# Patient Record
Sex: FEMALE | URBAN - METROPOLITAN AREA
[De-identification: names, ages, dates, MRNs, and addresses within clinical notes are randomized per-mention and may not be internally consistent; named-entity substitution may affect disease eponyms.]

---

## 2020-01-06 ENCOUNTER — CONSULTATION (OUTPATIENT)
Dept: URBAN - METROPOLITAN AREA CLINIC 79 | Facility: CLINIC | Age: 75
End: 2020-01-06

## 2020-01-06 DIAGNOSIS — H04.123: ICD-10-CM

## 2020-01-06 DIAGNOSIS — Z79.4: ICD-10-CM

## 2020-01-06 DIAGNOSIS — E11.3393: ICD-10-CM

## 2020-01-06 DIAGNOSIS — H26.493: ICD-10-CM

## 2020-01-06 PROCEDURE — 92250 FUNDUS PHOTOGRAPHY W/I&R: CPT

## 2020-01-06 PROCEDURE — 92014 COMPRE OPH EXAM EST PT 1/>: CPT

## 2020-01-06 ASSESSMENT — VISUAL ACUITY
OS_SC: 20/20
OD_SC: 20/25

## 2020-01-06 ASSESSMENT — TONOMETRY
OS_IOP_MMHG: 19
OD_IOP_MMHG: 19

## 2021-02-28 ENCOUNTER — IMMUNIZATIONS (OUTPATIENT)
Dept: FAMILY MEDICINE CLINIC | Facility: HOSPITAL | Age: 76
End: 2021-02-28

## 2021-02-28 DIAGNOSIS — Z23 ENCOUNTER FOR IMMUNIZATION: Primary | ICD-10-CM

## 2021-02-28 PROCEDURE — 91300 SARS-COV-2 / COVID-19 MRNA VACCINE (PFIZER-BIONTECH) 30 MCG: CPT

## 2021-02-28 PROCEDURE — 0001A SARS-COV-2 / COVID-19 MRNA VACCINE (PFIZER-BIONTECH) 30 MCG: CPT

## 2021-03-21 ENCOUNTER — IMMUNIZATIONS (OUTPATIENT)
Dept: FAMILY MEDICINE CLINIC | Facility: HOSPITAL | Age: 76
End: 2021-03-21

## 2021-03-21 DIAGNOSIS — Z23 ENCOUNTER FOR IMMUNIZATION: Primary | ICD-10-CM

## 2021-03-21 PROCEDURE — 91300 SARS-COV-2 / COVID-19 MRNA VACCINE (PFIZER-BIONTECH) 30 MCG: CPT

## 2021-03-21 PROCEDURE — 0002A SARS-COV-2 / COVID-19 MRNA VACCINE (PFIZER-BIONTECH) 30 MCG: CPT

## 2024-02-28 ENCOUNTER — CONSULT (OUTPATIENT)
Dept: GASTROENTEROLOGY | Facility: CLINIC | Age: 79
End: 2024-02-28
Payer: MEDICARE

## 2024-02-28 ENCOUNTER — TELEPHONE (OUTPATIENT)
Dept: GASTROENTEROLOGY | Facility: CLINIC | Age: 79
End: 2024-02-28

## 2024-02-28 VITALS
BODY MASS INDEX: 32.39 KG/M2 | HEIGHT: 62 IN | SYSTOLIC BLOOD PRESSURE: 100 MMHG | WEIGHT: 176 LBS | DIASTOLIC BLOOD PRESSURE: 60 MMHG

## 2024-02-28 DIAGNOSIS — Z79.01 ANTICOAGULATED: ICD-10-CM

## 2024-02-28 DIAGNOSIS — R13.19 ESOPHAGEAL DYSPHAGIA: Primary | ICD-10-CM

## 2024-02-28 DIAGNOSIS — K21.9 GASTROESOPHAGEAL REFLUX DISEASE, UNSPECIFIED WHETHER ESOPHAGITIS PRESENT: ICD-10-CM

## 2024-02-28 DIAGNOSIS — D68.59 HYPERCOAGULABLE STATE (HCC): ICD-10-CM

## 2024-02-28 DIAGNOSIS — K59.00 CONSTIPATION, UNSPECIFIED CONSTIPATION TYPE: ICD-10-CM

## 2024-02-28 DIAGNOSIS — R68.81 EARLY SATIETY: ICD-10-CM

## 2024-02-28 PROCEDURE — 99204 OFFICE O/P NEW MOD 45 MIN: CPT | Performed by: INTERNAL MEDICINE

## 2024-02-28 RX ORDER — LEVOTHYROXINE SODIUM 0.03 MG/1
TABLET ORAL
COMMUNITY

## 2024-02-28 RX ORDER — MECLIZINE HCL 25MG 25 MG/1
TABLET, CHEWABLE ORAL
COMMUNITY

## 2024-02-28 RX ORDER — FUROSEMIDE 20 MG/1
TABLET ORAL
COMMUNITY

## 2024-02-28 RX ORDER — SERTRALINE HYDROCHLORIDE 25 MG/1
25 TABLET, FILM COATED ORAL DAILY
COMMUNITY

## 2024-02-28 RX ORDER — LOSARTAN POTASSIUM 100 MG/1
100 TABLET ORAL DAILY
COMMUNITY

## 2024-02-28 RX ORDER — NITROGLYCERIN 0.4 MG/1
TABLET SUBLINGUAL
COMMUNITY

## 2024-02-28 RX ORDER — SEMAGLUTIDE 1.34 MG/ML
INJECTION, SOLUTION SUBCUTANEOUS
COMMUNITY
Start: 2024-02-26

## 2024-02-28 RX ORDER — AMIODARONE HYDROCHLORIDE 200 MG/1
200 TABLET ORAL DAILY
COMMUNITY

## 2024-02-28 RX ORDER — EMPAGLIFLOZIN 25 MG/1
25 TABLET, FILM COATED ORAL DAILY
COMMUNITY

## 2024-02-28 RX ORDER — PANTOPRAZOLE SODIUM 40 MG/1
TABLET, DELAYED RELEASE ORAL EVERY 24 HOURS
COMMUNITY

## 2024-02-28 RX ORDER — ATENOLOL 25 MG/1
TABLET ORAL
COMMUNITY

## 2024-02-28 RX ORDER — FAMOTIDINE 40 MG/1
TABLET, FILM COATED ORAL EVERY 24 HOURS
COMMUNITY

## 2024-02-28 RX ORDER — POLYETHYLENE GLYCOL 3350 17 G/17G
17 POWDER, FOR SOLUTION ORAL DAILY
Qty: 255 G | Refills: 1 | Status: SHIPPED | OUTPATIENT
Start: 2024-02-28

## 2024-02-28 RX ORDER — GABAPENTIN 100 MG/1
1 CAPSULE ORAL EVERY 24 HOURS
COMMUNITY

## 2024-02-28 NOTE — PROGRESS NOTES
[facility] - Outpatient Consultation  Jackie Cunningham 78 y.o. female MRN: [id number]  Encounter: [id number]    ASSESSMENT AND PLAN:      1. Esophageal dysphagia  Patient has history of peptic stricture, so we will proceed with upper endoscopy and possible dilation.  She will hold anticoagulation for 2 days prior.  She understands the risks in doing this  - EGD; Future    2. Gastroesophageal reflux disease, unspecified whether esophagitis present  Because of her peptic stricture, I advised her to get back on her pantoprazole and use indefinitely as she does have a complication of reflux disease  - EGD; Future    3. Early satiety  Likely gastroparesis related to her Ozempic, which she will hold prior to the procedure    4. Anticoagulated  She will hold Eliquis as above    5. Hypercoagulable state (HCC)  She will hold Eliquis as above    6. Constipation, unspecified constipation type  Likely related to Ozempic as well.  Will start MiraLAX and reassess symptoms.  I will also obtain records from her last colonoscopy, as it seems confusing as to why she was told to repeat in 5 years instead of 10 if she did not have any polyps  - polyethylene glycol (GLYCOLAX) 17 GM/SCOOP powder; Take 17 g by mouth daily  Dispense: 255 g; Refill: 1      Follow up Appointment: For upper endoscopy    _______________________      Chief Complaint   Patient presents with    Abdominal Pain     Radiates to back and lower back, pain is on LLQ, has had diarrhea       HPI:   Jackie Cunningham is a 78 y.o. year old female with a PMH significant for GERD with peptic stricture who presents from a consultation from PCP for concerns of a hiatal hernia, which apparently she was diagnosed with before, but recurrence of solid and liquid dysphagia as well as generalized abdominal pain and new constipation.  She has been on Ozempic since last fall.  She has noticed early satiety and worsening constipation with hard stools.  She  states that she had been on pantoprazole for years for reflux and has had multiple endoscopies with dilation due to a peptic stricture.  But because she felt no heartburn or regurgitation, she stopped the medication and now her dysphagia is back.  She states that sometimes food will get stuck in her chest and she will drink liquids to get it down, but sometimes that does not even help.  She is not a smoker.  She is also developed a change in bowel habits, needing multiple hard stools a day in order to feel evacuated.  She has developed a generalized abdominal pain that is worse on the left side and radiates to the back.  She is on apixaban for A-fib, but had the A-fib ablated in December due to episodes of syncope.  Loop recorder was also implanted, but she has not had any further syncopal episodes.  She last had a colonoscopy about 5 years ago and was told to repeat it now despite not having any polyps or family history of colon cancer.  She is a little confused and does not remember why she was told to repeat it at this time.  She has not seen blood or mucus in the stool.    Historical Information   Past Medical History:   Diagnosis Date    Diabetes mellitus (HCC)     GERD (gastroesophageal reflux disease)      Past Surgical History:   Procedure Laterality Date    APPENDECTOMY      CHOLECYSTECTOMY      COLONOSCOPY      UPPER GASTROINTESTINAL ENDOSCOPY       Social History     Substance and Sexual Activity   Alcohol Use Never     Social History     Substance and Sexual Activity   Drug Use Not on file     Social History     Tobacco Use   Smoking Status Former    Types: Cigarettes   Smokeless Tobacco Never     Family History   Problem Relation Age of Onset    Colon cancer Neg Hx     Colon polyps Neg Hx        Meds/Allergies     Current Outpatient Medications:     amiodarone 200 mg tablet    apixaban (Eliquis) 5 mg    atenolol (TENORMIN) 25 mg tablet    Cholecalciferol 125 MCG (5000 UT) capsule    famotidine (PEPCID)  "40 MG tablet    furosemide (LASIX) 20 mg tablet    gabapentin (NEURONTIN) 100 mg capsule    Jardiance 25 MG TABS    levothyroxine 25 mcg tablet    losartan (COZAAR) 100 MG tablet    Meclizine HCl 25 MG CHEW    nitroglycerin (NITROSTAT) 0.4 mg SL tablet    Ozempic, 1 MG/DOSE, 4 MG/3ML injection pen    pantoprazole (PROTONIX) 40 mg tablet    polyethylene glycol (GLYCOLAX) 17 GM/SCOOP powder    semaglutide, 0.25 or 0.5 mg/dose, (Ozempic, 0.25 or 0.5 MG/DOSE,) 2 mg/1.5 mL injection pen    sertraline (ZOLOFT) 25 mg tablet    Allergies   Allergen Reactions    Cephalexin Vomiting and Itching    Codeine Vomiting and Hives    Morphine GI Intolerance    Amoxicillin Dizziness and Rash     Other Reaction(s): HANDS ITCHING       PHYSICAL EXAM:    Blood pressure 100/60, height 5' 2\" (1.575 m), weight 79.8 kg (176 lb). Body mass index is 32.19 kg/m².  General Appearance: NAD, cooperative, alert  Eyes: Anicteric  GI:  Soft, non-tender, non-distended; normal bowel sounds; no masses, no organomegaly   Rectal: Deferred  Musculoskeletal: No edema.  Skin:  No jaundice    Lab Results:   No results found for: \"WBC\", \"HGB\", \"MCV\", \"PLT\", \"INR\"  No results found for: \"NA\", \"K\", \"CL\", \"CO2\", \"ANIONGAP\", \"BUN\", \"CREATININE\", \"GLUCOSE\", \"GLUF\", \"CALCIUM\", \"CORRECTEDCA\", \"AST\", \"ALT\", \"ALKPHOS\", \"PROT\", \"BILITOT\", \"EGFR\"  No results found for: \"IRON\", \"TIBC\", \"FERRITIN\"  No results found for: \"LIPASE\"    Radiology Results:   No results found.  "

## 2024-03-08 ENCOUNTER — TELEPHONE (OUTPATIENT)
Dept: GASTROENTEROLOGY | Facility: CLINIC | Age: 79
End: 2024-03-08

## 2024-03-08 NOTE — TELEPHONE ENCOUNTER
Provider: Hipolito  Procedure: EGD  Scheduled Date: 4-5-24  Location: [facility]  Medication(s) to stop: Eliquis  Days to hold: 2  Last dose should be: 4-2-24  Requested from: Dr Sonny BAZAN  Date requested: 3-8-24  Date received:  3-8-24  Patient alerted: 3-8-24 LVM

## 2024-03-22 ENCOUNTER — ANESTHESIA (OUTPATIENT)
Dept: ANESTHESIOLOGY | Facility: AMBULATORY SURGERY CENTER | Age: 79
End: 2024-03-22

## 2024-03-22 ENCOUNTER — ANESTHESIA EVENT (OUTPATIENT)
Dept: ANESTHESIOLOGY | Facility: AMBULATORY SURGERY CENTER | Age: 79
End: 2024-03-22

## 2024-03-29 ENCOUNTER — TELEPHONE (OUTPATIENT)
Dept: GASTROENTEROLOGY | Facility: CLINIC | Age: 79
End: 2024-03-29

## 2024-03-29 NOTE — TELEPHONE ENCOUNTER
Procedure confirmed  Endoscopy     Via: Voice mail    Instructions given: Given to Patient at Visit     Prep Given: N/A    Call the office if there are any questions.      Advised patient of receipt of clearance for two day Eliquis hold prior to   Procedure.  Asked that she call to confirm receipt.

## 2024-04-03 ENCOUNTER — TELEPHONE (OUTPATIENT)
Dept: GASTROENTEROLOGY | Facility: AMBULATORY SURGERY CENTER | Age: 79
End: 2024-04-03

## 2024-04-03 NOTE — TELEPHONE ENCOUNTER
Patient called to verify arrival time. She did say she received the phone call with arrival time.of 9:00am, she just wanted to confirm  Thank you

## 2024-04-05 ENCOUNTER — HOSPITAL ENCOUNTER (OUTPATIENT)
Dept: GASTROENTEROLOGY | Facility: AMBULATORY SURGERY CENTER | Age: 79
Discharge: HOME | End: 2024-04-05
Attending: INTERNAL MEDICINE
Payer: MEDICARE

## 2024-04-05 ENCOUNTER — ANESTHESIA (OUTPATIENT)
Dept: GASTROENTEROLOGY | Facility: AMBULATORY SURGERY CENTER | Age: 79
End: 2024-04-05

## 2024-04-05 ENCOUNTER — ANESTHESIA EVENT (OUTPATIENT)
Dept: GASTROENTEROLOGY | Facility: AMBULATORY SURGERY CENTER | Age: 79
End: 2024-04-05

## 2024-04-05 VITALS
WEIGHT: 176 LBS | HEIGHT: 62 IN | TEMPERATURE: 97.4 F | RESPIRATION RATE: 20 BRPM | HEART RATE: 81 BPM | SYSTOLIC BLOOD PRESSURE: 127 MMHG | OXYGEN SATURATION: 95 % | BODY MASS INDEX: 32.39 KG/M2 | DIASTOLIC BLOOD PRESSURE: 69 MMHG

## 2024-04-05 DIAGNOSIS — K21.9 GASTROESOPHAGEAL REFLUX DISEASE, UNSPECIFIED WHETHER ESOPHAGITIS PRESENT: ICD-10-CM

## 2024-04-05 DIAGNOSIS — R13.19 ESOPHAGEAL DYSPHAGIA: ICD-10-CM

## 2024-04-05 PROCEDURE — 43249 ESOPH EGD DILATION <30 MM: CPT | Performed by: INTERNAL MEDICINE

## 2024-04-05 RX ORDER — PROPOFOL 10 MG/ML
INJECTION, EMULSION INTRAVENOUS AS NEEDED
Status: DISCONTINUED | OUTPATIENT
Start: 2024-04-05 | End: 2024-04-05

## 2024-04-05 RX ORDER — LIDOCAINE HYDROCHLORIDE 10 MG/ML
INJECTION, SOLUTION EPIDURAL; INFILTRATION; INTRACAUDAL; PERINEURAL AS NEEDED
Status: DISCONTINUED | OUTPATIENT
Start: 2024-04-05 | End: 2024-04-05

## 2024-04-05 RX ORDER — SODIUM CHLORIDE 9 MG/ML
INJECTION, SOLUTION INTRAVENOUS CONTINUOUS PRN
Status: DISCONTINUED | OUTPATIENT
Start: 2024-04-05 | End: 2024-04-05

## 2024-04-05 RX ORDER — SODIUM CHLORIDE, SODIUM LACTATE, POTASSIUM CHLORIDE, CALCIUM CHLORIDE 600; 310; 30; 20 MG/100ML; MG/100ML; MG/100ML; MG/100ML
INJECTION, SOLUTION INTRAVENOUS CONTINUOUS PRN
Status: DISCONTINUED | OUTPATIENT
Start: 2024-04-05 | End: 2024-04-05

## 2024-04-05 RX ORDER — SODIUM CHLORIDE, SODIUM LACTATE, POTASSIUM CHLORIDE, CALCIUM CHLORIDE 600; 310; 30; 20 MG/100ML; MG/100ML; MG/100ML; MG/100ML
75 INJECTION, SOLUTION INTRAVENOUS CONTINUOUS
Status: CANCELLED | OUTPATIENT
Start: 2024-04-05

## 2024-04-05 RX ADMIN — PROPOFOL 50 MG: 10 INJECTION, EMULSION INTRAVENOUS at 09:46

## 2024-04-05 RX ADMIN — LIDOCAINE HYDROCHLORIDE 25 MG: 10 INJECTION, SOLUTION EPIDURAL; INFILTRATION; INTRACAUDAL; PERINEURAL at 09:41

## 2024-04-05 RX ADMIN — SODIUM CHLORIDE, SODIUM LACTATE, POTASSIUM CHLORIDE, CALCIUM CHLORIDE: 600; 310; 30; 20 INJECTION, SOLUTION INTRAVENOUS at 09:41

## 2024-04-05 RX ADMIN — PROPOFOL 150 MG: 10 INJECTION, EMULSION INTRAVENOUS at 09:41

## 2024-04-05 RX ADMIN — PROPOFOL 50 MG: 10 INJECTION, EMULSION INTRAVENOUS at 09:50

## 2024-04-05 NOTE — H&P
History and Physical -  [facility]  Jackie Cunningham 78 y.o. female MRN: [id number]    HPI: Jackie Cunningham is a 78 y.o. female who presents for upper endoscopy and dilation.  She had a peptic stricture in the past that was dilated.  She is on Eliquis that has been held for 2 days    REVIEW OF SYSTEMS: Per the HPI, and otherwise unremarkable.    Historical Information   Past Medical History:   Diagnosis Date    Atrial fibrillation (HCC)     Diabetes mellitus (HCC)     DM 2    GERD (gastroesophageal reflux disease)     Hypertension      Past Surgical History:   Procedure Laterality Date    ABLATION OF DYSRHYTHMIC FOCUS      APPENDECTOMY      CHOLECYSTECTOMY      COLONOSCOPY      UPPER GASTROINTESTINAL ENDOSCOPY       Social History   Social History     Substance and Sexual Activity   Alcohol Use Never     Social History     Substance and Sexual Activity   Drug Use Not on file     Social History     Tobacco Use   Smoking Status Former    Types: Cigarettes   Smokeless Tobacco Never     Family History   Problem Relation Age of Onset    Colon cancer Neg Hx     Colon polyps Neg Hx        Meds/Allergies       Current Outpatient Medications:     amiodarone 200 mg tablet    atenolol (TENORMIN) 25 mg tablet    Cholecalciferol 125 MCG (5000 UT) capsule    famotidine (PEPCID) 40 MG tablet    furosemide (LASIX) 20 mg tablet    gabapentin (NEURONTIN) 100 mg capsule    levothyroxine 25 mcg tablet    losartan (COZAAR) 100 MG tablet    pantoprazole (PROTONIX) 40 mg tablet    polyethylene glycol (GLYCOLAX) 17 GM/SCOOP powder    sertraline (ZOLOFT) 25 mg tablet    apixaban (Eliquis) 5 mg    Jardiance 25 MG TABS    Meclizine HCl 25 MG CHEW    nitroglycerin (NITROSTAT) 0.4 mg SL tablet    Ozempic, 1 MG/DOSE, 4 MG/3ML injection pen    semaglutide, 0.25 or 0.5 mg/dose, (Ozempic, 0.25 or 0.5 MG/DOSE,) 2 mg/1.5 mL injection pen  No current facility-administered medications for this encounter.    Facility-Administered  "Medications Ordered in Other Encounters:     sodium chloride 0.9 % infusion, , Intravenous, Continuous PRN, New Bag at 04/05/24 0909    Allergies   Allergen Reactions    Cephalexin Vomiting and Itching    Codeine Vomiting and Hives    Morphine GI Intolerance    Amoxicillin Dizziness and Rash     Other Reaction(s): HANDS ITCHING       Objective     /75   Pulse 81   Temp (!) 97.4 °F (36.3 °C) (Temporal)   Resp 18   Ht 5' 2\" (1.575 m)   Wt 79.8 kg (176 lb)   SpO2 98%   BMI 32.19 kg/m²     PHYSICAL EXAM    General Appearance: NAD, cooperative, alert  Eyes: Anicteric  GI:  Soft, non-tender, non-distended; normal bowel sounds; no masses, no organomegaly   Rectal: Deferred until procedure  Musculoskeletal: No edema.  Skin:  No jaundice    ASSESSMENT/PLAN:  This is a 78 y.o. year old female here for upper endoscopy and dilation, and she is stable and optimized for her procedure.        "

## 2024-04-05 NOTE — ANESTHESIA PREPROCEDURE EVALUATION
Procedure:  EGD    Relevant Problems   HEMATOLOGY   (+) Hypercoagulable state (HCC)        Physical Exam    Airway    Mallampati score: I  TM Distance: >3 FB  Neck ROM: full     Dental       Cardiovascular  Cardiovascular exam normal    Pulmonary  Pulmonary exam normal     Other Findings  post-pubertal.      Anesthesia Plan  ASA Score- 2     Anesthesia Type- IV sedation with anesthesia with ASA Monitors.         Additional Monitors:     Airway Plan:            Plan Factors-Exercise tolerance (METS): >4 METS.    Chart reviewed. EKG reviewed. Imaging results reviewed. Existing labs reviewed. Patient summary reviewed.                  Induction- intravenous.    Postoperative Plan- Plan for postoperative opioid use. Planned trial extubation    Informed Consent- Anesthetic plan and risks discussed with patient.  I personally reviewed this patient with the CRNA. Discussed and agreed on the Anesthesia Plan with the CRNA..

## 2024-04-05 NOTE — ANESTHESIA POSTPROCEDURE EVALUATION
Post-Op Assessment Note    CV Status:  Stable  Pain Score: 0    Pain management: adequate    Multimodal analgesia used between 6 hours prior to anesthesia start to PACU discharge    Mental Status:  Alert and awake   Hydration Status:  Euvolemic and stable   PONV Controlled:  Controlled   Airway Patency:  Patent  Two or more mitigation strategies used for obstructive sleep apnea   Post Op Vitals Reviewed: Yes    No anethesia notable event occurred.    Staff: CRNA               BP   110/58   Temp   97   Pulse  76   Resp   10   SpO2   92

## 2024-05-15 ENCOUNTER — APPOINTMENT (OUTPATIENT)
Dept: RADIOLOGY | Facility: HOSPITAL | Age: 79
DRG: 603 | End: 2024-05-15
Payer: MEDICARE

## 2024-05-15 ENCOUNTER — HOSPITAL ENCOUNTER (EMERGENCY)
Facility: HOSPITAL | Age: 79
Discharge: HOME | DRG: 603 | End: 2024-05-15
Attending: EMERGENCY MEDICINE
Payer: MEDICARE

## 2024-05-15 ENCOUNTER — APPOINTMENT (EMERGENCY)
Dept: RADIOLOGY | Facility: HOSPITAL | Age: 79
DRG: 603 | End: 2024-05-15
Payer: MEDICARE

## 2024-05-15 ENCOUNTER — APPOINTMENT (EMERGENCY)
Dept: CT IMAGING | Facility: HOSPITAL | Age: 79
DRG: 603 | End: 2024-05-15
Payer: MEDICARE

## 2024-05-15 VITALS
TEMPERATURE: 97.9 F | SYSTOLIC BLOOD PRESSURE: 128 MMHG | HEART RATE: 86 BPM | RESPIRATION RATE: 18 BRPM | OXYGEN SATURATION: 97 % | DIASTOLIC BLOOD PRESSURE: 74 MMHG

## 2024-05-15 DIAGNOSIS — M25.511 RIGHT SHOULDER PAIN: ICD-10-CM

## 2024-05-15 DIAGNOSIS — L03.115 CELLULITIS OF RIGHT FOOT: Primary | ICD-10-CM

## 2024-05-15 DIAGNOSIS — R73.9 HYPERGLYCEMIA: ICD-10-CM

## 2024-05-15 PROBLEM — I10 PRIMARY HYPERTENSION: Status: ACTIVE | Noted: 2024-05-15

## 2024-05-15 PROBLEM — F32.A DEPRESSION: Status: ACTIVE | Noted: 2024-05-15

## 2024-05-15 LAB
ALBUMIN SERPL BCP-MCNC: 4 G/DL (ref 3.5–5)
ALP SERPL-CCNC: 97 U/L (ref 34–104)
ALT SERPL W P-5'-P-CCNC: 14 U/L (ref 7–52)
ANION GAP SERPL CALCULATED.3IONS-SCNC: 9 MMOL/L (ref 4–13)
APTT PPP: 31 SECONDS (ref 23–37)
AST SERPL W P-5'-P-CCNC: 12 U/L (ref 13–39)
B-OH-BUTYR SERPL-MCNC: 0.07 MMOL/L (ref 0.02–0.27)
BASE EX.OXY STD BLDV CALC-SCNC: 50.6 % (ref 60–80)
BASE EXCESS BLDV CALC-SCNC: 2.4 MMOL/L
BASOPHILS # BLD AUTO: 0.02 THOUSANDS/ÂΜL (ref 0–0.1)
BASOPHILS NFR BLD AUTO: 0 % (ref 0–1)
BILIRUB SERPL-MCNC: 1.62 MG/DL (ref 0.2–1)
BUN SERPL-MCNC: 13 MG/DL (ref 5–25)
CALCIUM SERPL-MCNC: 9.2 MG/DL (ref 8.4–10.2)
CARDIAC TROPONIN I PNL SERPL HS: 5 NG/L
CHLORIDE SERPL-SCNC: 92 MMOL/L (ref 96–108)
CO2 SERPL-SCNC: 30 MMOL/L (ref 21–32)
CREAT SERPL-MCNC: 1 MG/DL (ref 0.6–1.3)
EOSINOPHIL # BLD AUTO: 0.05 THOUSAND/ÂΜL (ref 0–0.61)
EOSINOPHIL NFR BLD AUTO: 1 % (ref 0–6)
ERYTHROCYTE [DISTWIDTH] IN BLOOD BY AUTOMATED COUNT: 11.6 % (ref 11.6–15.1)
GFR SERPL CREATININE-BSD FRML MDRD: 54 ML/MIN/1.73SQ M
GLUCOSE SERPL-MCNC: 304 MG/DL (ref 65–140)
GLUCOSE SERPL-MCNC: 454 MG/DL (ref 65–140)
HCO3 BLDV-SCNC: 28.7 MMOL/L (ref 24–30)
HCT VFR BLD AUTO: 39.9 % (ref 34.8–46.1)
HGB BLD-MCNC: 13.4 G/DL (ref 11.5–15.4)
IMM GRANULOCYTES # BLD AUTO: 0.04 THOUSAND/UL (ref 0–0.2)
IMM GRANULOCYTES NFR BLD AUTO: 1 % (ref 0–2)
INR PPP: 1.19 (ref 0.84–1.19)
LACTATE SERPL-SCNC: 2 MMOL/L (ref 0.5–2)
LYMPHOCYTES # BLD AUTO: 1.16 THOUSANDS/ÂΜL (ref 0.6–4.47)
LYMPHOCYTES NFR BLD AUTO: 15 % (ref 14–44)
MCH RBC QN AUTO: 29.6 PG (ref 26.8–34.3)
MCHC RBC AUTO-ENTMCNC: 33.6 G/DL (ref 31.4–37.4)
MCV RBC AUTO: 88 FL (ref 82–98)
MONOCYTES # BLD AUTO: 0.63 THOUSAND/ÂΜL (ref 0.17–1.22)
MONOCYTES NFR BLD AUTO: 8 % (ref 4–12)
NEUTROPHILS # BLD AUTO: 5.68 THOUSANDS/ÂΜL (ref 1.85–7.62)
NEUTS SEG NFR BLD AUTO: 75 % (ref 43–75)
NRBC BLD AUTO-RTO: 0 /100 WBCS
O2 CT BLDV-SCNC: 9.9 ML/DL
PCO2 BLDV: 51.7 MM HG (ref 42–50)
PH BLDV: 7.36 [PH] (ref 7.3–7.4)
PLATELET # BLD AUTO: 199 THOUSANDS/UL (ref 149–390)
PMV BLD AUTO: 10.2 FL (ref 8.9–12.7)
PO2 BLDV: 24.3 MM HG (ref 35–45)
POTASSIUM SERPL-SCNC: 4 MMOL/L (ref 3.5–5.3)
PROCALCITONIN SERPL-MCNC: 0.06 NG/ML
PROT SERPL-MCNC: 8.2 G/DL (ref 6.4–8.4)
PROTHROMBIN TIME: 15.5 SECONDS (ref 11.6–14.5)
RBC # BLD AUTO: 4.52 MILLION/UL (ref 3.81–5.12)
SODIUM SERPL-SCNC: 131 MMOL/L (ref 135–147)
WBC # BLD AUTO: 7.58 THOUSAND/UL (ref 4.31–10.16)

## 2024-05-15 PROCEDURE — 99244 OFF/OP CNSLTJ NEW/EST MOD 40: CPT | Performed by: PHYSICIAN ASSISTANT

## 2024-05-15 PROCEDURE — 36415 COLL VENOUS BLD VENIPUNCTURE: CPT

## 2024-05-15 PROCEDURE — 82948 REAGENT STRIP/BLOOD GLUCOSE: CPT

## 2024-05-15 PROCEDURE — 82010 KETONE BODYS QUAN: CPT

## 2024-05-15 PROCEDURE — 73630 X-RAY EXAM OF FOOT: CPT

## 2024-05-15 PROCEDURE — 73701 CT LOWER EXTREMITY W/DYE: CPT

## 2024-05-15 PROCEDURE — 10060 I&D ABSCESS SIMPLE/SINGLE: CPT

## 2024-05-15 PROCEDURE — 84484 ASSAY OF TROPONIN QUANT: CPT

## 2024-05-15 PROCEDURE — 96372 THER/PROPH/DIAG INJ SC/IM: CPT

## 2024-05-15 PROCEDURE — 82805 BLOOD GASES W/O2 SATURATION: CPT

## 2024-05-15 PROCEDURE — 85025 COMPLETE CBC W/AUTO DIFF WBC: CPT

## 2024-05-15 PROCEDURE — 96361 HYDRATE IV INFUSION ADD-ON: CPT

## 2024-05-15 PROCEDURE — 99285 EMERGENCY DEPT VISIT HI MDM: CPT

## 2024-05-15 PROCEDURE — 87070 CULTURE OTHR SPECIMN AEROBIC: CPT | Performed by: EMERGENCY MEDICINE

## 2024-05-15 PROCEDURE — 84145 PROCALCITONIN (PCT): CPT

## 2024-05-15 PROCEDURE — 85610 PROTHROMBIN TIME: CPT

## 2024-05-15 PROCEDURE — 83605 ASSAY OF LACTIC ACID: CPT

## 2024-05-15 PROCEDURE — 87040 BLOOD CULTURE FOR BACTERIA: CPT

## 2024-05-15 PROCEDURE — 80053 COMPREHEN METABOLIC PANEL: CPT

## 2024-05-15 PROCEDURE — 85730 THROMBOPLASTIN TIME PARTIAL: CPT

## 2024-05-15 PROCEDURE — 96365 THER/PROPH/DIAG IV INF INIT: CPT

## 2024-05-15 PROCEDURE — 71045 X-RAY EXAM CHEST 1 VIEW: CPT

## 2024-05-15 PROCEDURE — 87205 SMEAR GRAM STAIN: CPT | Performed by: EMERGENCY MEDICINE

## 2024-05-15 PROCEDURE — 73030 X-RAY EXAM OF SHOULDER: CPT

## 2024-05-15 PROCEDURE — 93005 ELECTROCARDIOGRAM TRACING: CPT

## 2024-05-15 PROCEDURE — 87077 CULTURE AEROBIC IDENTIFY: CPT | Performed by: EMERGENCY MEDICINE

## 2024-05-15 PROCEDURE — 96366 THER/PROPH/DIAG IV INF ADDON: CPT

## 2024-05-15 PROCEDURE — 99284 EMERGENCY DEPT VISIT MOD MDM: CPT | Performed by: PHYSICIAN ASSISTANT

## 2024-05-15 RX ORDER — INSULIN LISPRO 100 [IU]/ML
8 INJECTION, SOLUTION INTRAVENOUS; SUBCUTANEOUS ONCE
Status: COMPLETED | OUTPATIENT
Start: 2024-05-15 | End: 2024-05-15

## 2024-05-15 RX ORDER — CLINDAMYCIN PHOSPHATE 900 MG/50ML
900 INJECTION, SOLUTION INTRAVENOUS ONCE
Status: COMPLETED | OUTPATIENT
Start: 2024-05-15 | End: 2024-05-15

## 2024-05-15 RX ORDER — CLINDAMYCIN HYDROCHLORIDE 300 MG/1
300 CAPSULE ORAL EVERY 6 HOURS
Qty: 28 CAPSULE | Refills: 0 | Status: ON HOLD | OUTPATIENT
Start: 2024-05-15 | End: 2024-05-20

## 2024-05-15 RX ORDER — LIDOCAINE HYDROCHLORIDE 10 MG/ML
5 INJECTION, SOLUTION EPIDURAL; INFILTRATION; INTRACAUDAL; PERINEURAL ONCE
Status: COMPLETED | OUTPATIENT
Start: 2024-05-15 | End: 2024-05-15

## 2024-05-15 RX ADMIN — SODIUM CHLORIDE 1000 ML: 0.9 INJECTION, SOLUTION INTRAVENOUS at 18:12

## 2024-05-15 RX ADMIN — CLINDAMYCIN IN 5 PERCENT DEXTROSE 900 MG: 18 INJECTION, SOLUTION INTRAVENOUS at 16:39

## 2024-05-15 RX ADMIN — LIDOCAINE HYDROCHLORIDE 5 ML: 10 INJECTION, SOLUTION EPIDURAL; INFILTRATION; INTRACAUDAL; PERINEURAL at 19:50

## 2024-05-15 RX ADMIN — IOHEXOL 100 ML: 350 INJECTION, SOLUTION INTRAVENOUS at 17:46

## 2024-05-15 RX ADMIN — INSULIN LISPRO 8 UNITS: 100 INJECTION, SOLUTION INTRAVENOUS; SUBCUTANEOUS at 19:34

## 2024-05-15 NOTE — ED PROVIDER NOTES
History  Chief Complaint   Patient presents with    Foot Pain     Pt reports right foot pain, redness, and swelling for the past two days, denies any injury. Also reports right shoulder pain that she thinks she injured while lifting boxes.      Patient is  a 78-year-old female pmhx diabetes, afib arriving for evaluation of primarily right foot pain.  Patient states that 2 days ago she started with dorsal foot pain at her MTP joints.  Patient reports that today progressed to the erythema and swelling of her right great toe, and in between her right great toes is a wound that is present.  Patient has lymphangitic streaking from the spacing between her first and second digit on right foot.  Patient has scant drainage.  Patient denies fevers or chills.  Patient reports that her foot foot hurts and radiates up into her mid shin is has tenderness of her right lower leg.  Patient has no calf tenderness.  Patient has no pain on the bottom of her foot.  Patient has tenderness when evaluating the space between the first and her second foot where the wound is present.  Patient is a diabetic.  Patient was concerned because the swelling of her right great toe, and redness started 3 hours prior to arrival and per her has gotten worse.  No known injury, no known foreign body or concern for foreign body.    Patient is also arriving for right shoulder pain that started a few days ago while she was moving.  Patient expressly states that she was lifting a heavy box about pain in the right shoulder.  Patient reports decrease in range of motion secondary to the pain.  There is no obvious rash, no gross effusion, no erythema at the right shoulder joint.  Patient has no palpable chest pain, denies any chest pain, shortness of breath, dizziness, syncope, near syncope.  Patient states that she has pain when she raises her arm to the side.         Prior to Admission Medications   Prescriptions Last Dose Informant Patient Reported? Taking?    Cholecalciferol 125 MCG (5000 UT) capsule  Self Yes No   Sig: every 24 hours   Jardiance 25 MG TABS  Self Yes No   Sig: Take 25 mg by mouth daily   Meclizine HCl 25 MG CHEW  Self Yes No   Si tablet Orally Three times a day as needed for 30 days   Ozempic, 1 MG/DOSE, 4 MG/3ML injection pen  Self Yes No   amiodarone 200 mg tablet  Self Yes No   Sig: Take 200 mg by mouth daily   apixaban (Eliquis) 5 mg  Self Yes No   Sig: take 1 tablet by mouth twice a day 30   atenolol (TENORMIN) 25 mg tablet  Self Yes No   famotidine (PEPCID) 40 MG tablet  Self Yes No   Sig: every 24 hours   furosemide (LASIX) 20 mg tablet  Self Yes No   Sig: TAKE ONE TABLET BY MOUTH EVERY DAY for 90   gabapentin (NEURONTIN) 100 mg capsule  Self Yes No   Si capsule every 24 hours   levothyroxine 25 mcg tablet  Self Yes No   losartan (COZAAR) 100 MG tablet  Self Yes No   Sig: Take 100 mg by mouth daily   nitroglycerin (NITROSTAT) 0.4 mg SL tablet  Self Yes No   Si tab under tongue every 5 mins as directed for chest pain Sublingual   pantoprazole (PROTONIX) 40 mg tablet  Self Yes No   Sig: every 24 hours   polyethylene glycol (GLYCOLAX) 17 GM/SCOOP powder   No No   Sig: Take 17 g by mouth daily   semaglutide, 0.25 or 0.5 mg/dose, (Ozempic, 0.25 or 0.5 MG/DOSE,) 2 mg/1.5 mL injection pen  Self Yes No   Sig: Inject under the skin once a week   sertraline (ZOLOFT) 25 mg tablet  Self Yes No   Sig: Take 25 mg by mouth daily      Facility-Administered Medications: None       Past Medical History:   Diagnosis Date    Atrial fibrillation (HCC)     Diabetes mellitus (HCC)     DM 2    GERD (gastroesophageal reflux disease)     Hypertension        Past Surgical History:   Procedure Laterality Date    ABLATION OF DYSRHYTHMIC FOCUS      APPENDECTOMY      CHOLECYSTECTOMY      COLONOSCOPY      UPPER GASTROINTESTINAL ENDOSCOPY         Family History   Problem Relation Age of Onset    Colon cancer Neg Hx     Colon polyps Neg Hx      I have reviewed and  agree with the history as documented.    E-Cigarette/Vaping    E-Cigarette Use Never User      E-Cigarette/Vaping Substances     Social History     Tobacco Use    Smoking status: Former     Types: Cigarettes    Smokeless tobacco: Never   Vaping Use    Vaping status: Never Used   Substance Use Topics    Alcohol use: Never       Review of Systems   Constitutional: Negative.    HENT: Negative.     Eyes: Negative.    Respiratory: Negative.  Negative for shortness of breath.    Cardiovascular: Negative.    Gastrointestinal: Negative.    Endocrine: Negative.    Genitourinary: Negative.    Musculoskeletal:  Positive for arthralgias and myalgias.   Skin:  Positive for color change and wound.   Allergic/Immunologic: Negative.    Neurological: Negative.    Hematological: Negative.    Psychiatric/Behavioral: Negative.     All other systems reviewed and are negative.      Physical Exam  Physical Exam  Vitals and nursing note reviewed.   Constitutional:       Appearance: Normal appearance. She is normal weight.   HENT:      Head: Normocephalic.      Right Ear: External ear normal.      Left Ear: External ear normal.      Nose: Nose normal.      Mouth/Throat:      Mouth: Mucous membranes are moist.   Eyes:      Extraocular Movements: Extraocular movements intact.      Conjunctiva/sclera: Conjunctivae normal.      Pupils: Pupils are equal, round, and reactive to light.   Cardiovascular:      Rate and Rhythm: Normal rate and regular rhythm.      Pulses: Normal pulses.           Dorsalis pedis pulses are 2+ on the right side.        Posterior tibial pulses are 2+ on the right side.      Heart sounds: Normal heart sounds.   Pulmonary:      Effort: Pulmonary effort is normal.      Breath sounds: Normal breath sounds.   Abdominal:      General: Abdomen is flat. Bowel sounds are normal.      Palpations: Abdomen is soft.   Musculoskeletal:         General: Swelling, tenderness and signs of injury present.      Right shoulder: Tenderness  present. No swelling, deformity, effusion, laceration, bony tenderness or crepitus. Decreased range of motion. Normal strength.      Left shoulder: Normal.      Right upper arm: Normal.      Left upper arm: Normal.        Arms:       Cervical back: Normal range of motion and neck supple.      Right foot: Decreased range of motion.        Feet:       Comments: +empty can test on right shoulder    Feet:      Right foot:      Skin integrity: Skin breakdown and erythema present. No ulcer, blister, warmth, callus, dry skin or fissure.   Skin:     General: Skin is warm.      Capillary Refill: Capillary refill takes less than 2 seconds.   Neurological:      General: No focal deficit present.      Mental Status: She is alert and oriented to person, place, and time.   Psychiatric:         Mood and Affect: Mood normal.         Behavior: Behavior normal.         Thought Content: Thought content normal.         Judgment: Judgment normal.               Vital Signs  ED Triage Vitals [05/15/24 1442]   Temperature Pulse Respirations Blood Pressure SpO2   97.9 °F (36.6 °C) 86 18 128/74 97 %      Temp Source Heart Rate Source Patient Position - Orthostatic VS BP Location FiO2 (%)   Temporal Monitor Sitting Left arm --      Pain Score       --           Vitals:    05/15/24 1442   BP: 128/74   Pulse: 86   Patient Position - Orthostatic VS: Sitting         Visual Acuity      ED Medications  Medications   sodium chloride 0.9 % bolus 1,000 mL (1,000 mL Intravenous New Bag 5/15/24 1812)   clindamycin (CLEOCIN) IVPB (premix in dextrose) 900 mg 50 mL (0 mg Intravenous Stopped 5/15/24 1811)   iohexol (OMNIPAQUE) 350 MG/ML injection (MULTI-DOSE) 100 mL (100 mL Intravenous Given 5/15/24 1746)       Diagnostic Studies  Results Reviewed       Procedure Component Value Units Date/Time    Blood gas, venous [[id number]] Collected: 05/15/24 1817    Lab Status: In process Specimen: Blood from Arm, Left Updated: 05/15/24 1823    HS Troponin 0hr (reflex  protocol) [[id number]] Collected: 05/15/24 1817    Lab Status: In process Specimen: Blood from Arm, Left Updated: 05/15/24 1822    Beta Hydroxybutyrate [[id number]] Collected: 05/15/24 1817    Lab Status: In process Specimen: Blood from Arm, Left Updated: 05/15/24 1822    Protime-INR [[id number]]  (Abnormal) Collected: 05/15/24 1627    Lab Status: Final result Specimen: Blood from Arm, Left Updated: 05/15/24 1738     Protime 15.5 seconds      INR 1.19    APTT [[id number]]  (Normal) Collected: 05/15/24 1627    Lab Status: Final result Specimen: Blood from Arm, Left Updated: 05/15/24 1738     PTT 31 seconds     Comprehensive metabolic panel [[id number]]  (Abnormal) Collected: 05/15/24 1627    Lab Status: Final result Specimen: Blood from Arm, Left Updated: 05/15/24 1721     Sodium 131 mmol/L      Potassium 4.0 mmol/L      Chloride 92 mmol/L      CO2 30 mmol/L      ANION GAP 9 mmol/L      BUN 13 mg/dL      Creatinine 1.00 mg/dL      Glucose 454 mg/dL      Calcium 9.2 mg/dL      AST 12 U/L      ALT 14 U/L      Alkaline Phosphatase 97 U/L      Total Protein 8.2 g/dL      Albumin 4.0 g/dL      Total Bilirubin 1.62 mg/dL      eGFR 54 ml/min/1.73sq m     Narrative:      National Kid[employer] Foundation guidelines for Chronic Kidney Disease (CKD):     Stage 1 with normal or high GFR (GFR > 90 mL/min/1.73 square meters)    Stage 2 Mild CKD (GFR = 60-89 mL/min/1.73 square meters)    Stage 3A Moderate CKD (GFR = 45-59 mL/min/1.73 square meters)    Stage 3B Moderate CKD (GFR = 30-44 mL/min/1.73 square meters)    Stage 4 Severe CKD (GFR = 15-29 mL/min/1.73 square meters)    Stage 5 End Stage CKD (GFR <15 mL/min/1.73 square meters)  Note: GFR calculation is accurate only with a steady state creatinine    Procalcitonin [[id number]]  (Normal) Collected: 05/15/24 1627    Lab Status: Final result Specimen: Blood from Arm, Left Updated: 05/15/24 1709     Procalcitonin 0.06 ng/ml     Lactic acid [[id number]]  (Normal) Collected: 05/15/24  1627    Lab Status: Final result Specimen: Blood from Arm, Left Updated: 05/15/24 1657     LACTIC ACID 2.0 mmol/L     Narrative:      Result may be elevated if tourniquet was used during collection.    CBC and differential [[id number]] Collected: 05/15/24 1627    Lab Status: Final result Specimen: Blood from Arm, Left Updated: 05/15/24 1643     WBC 7.58 Thousand/uL      RBC 4.52 Million/uL      Hemoglobin 13.4 g/dL      Hematocrit 39.9 %      MCV 88 fL      MCH 29.6 pg      MCHC 33.6 g/dL      RDW 11.6 %      MPV 10.2 fL      Platelets 199 Thousands/uL      nRBC 0 /100 WBCs      Segmented % 75 %      Immature Grans % 1 %      Lymphocytes % 15 %      Monocytes % 8 %      Eosinophils Relative 1 %      Basophils Relative 0 %      Absolute Neutrophils 5.68 Thousands/µL      Absolute Immature Grans 0.04 Thousand/uL      Absolute Lymphocytes 1.16 Thousands/µL      Absolute Monocytes 0.63 Thousand/µL      Eosinophils Absolute 0.05 Thousand/µL      Basophils Absolute 0.02 Thousands/µL     Blood culture #1 [[id number]] Collected: 05/15/24 1634    Lab Status: In process Specimen: Blood from Arm, Right Updated: 05/15/24 1642    Blood culture #2 [[id number]] Collected: 05/15/24 1627    Lab Status: In process Specimen: Blood from Line, Venous Updated: 05/15/24 1642                   XR chest 1 view portable   ED Interpretation by PATI Villa (05/15 1814)   No acute cardiopulmonary disease      XR foot 3+ vw right   ED Interpretation by PATI Villa (05/15 1813)   No obvious fracture or gas pattern      XR shoulder 2+ views RIGHT   ED Interpretation by PATI Villa (05/15 1813)   No acute osseous abnormality       CT lower extremity w contrast right    (Results Pending)              Procedures  ECG 12 Lead Documentation Only    Date/Time: 5/15/2024 4:56 PM    Performed by: PATI Villa  Authorized by: PATI Villa    Indications / Diagnosis:  Right shoulder pain  ECG reviewed  by me, the ED Provider: yes    Patient location:  ED  Interpretation:     Interpretation: normal    Rate:     ECG rate:  81    ECG rate assessment: normal    Rhythm:     Rhythm: sinus rhythm    Ectopy:     Ectopy: none    QRS:     QRS axis:  Normal  Conduction:     Conduction: normal    ST segments:     ST segments:  Normal  T waves:     T waves: normal             ED Course                               SBIRT 20yo+      Flowsheet Row Most Recent Value   Initial Alcohol Screen: US AUDIT-C     1. How often do you have a drink containing alcohol? 0 Filed at: 05/15/2024 1443   2. How many drinks containing alcohol do you have on a typical day you are drinking?  0 Filed at: 05/15/2024 1443   3a. Male UNDER 65: How often do you have five or more drinks on one occasion? 0 Filed at: 05/15/2024 1443   3b. FEMALE Any Age, or MALE 65+: How often do you have 4 or more drinks on one occassion? 0 Filed at: 05/15/2024 1443   Audit-C Score 0 Filed at: 05/15/2024 1443   TAMRA: How many times in the past year have you...    Used an illegal drug or used a prescription medication for non-medical reasons? Never Filed at: 05/15/2024 1443                      Medical Decision Making  DDx: Cellulitis, infected diabetic wound, electrolyte abnormality  Patient is a 78-year-old male arriving for evaluation of lymphangitic streaking, right toe swelling blister after 2 days of right foot pain.  Wound is between great and second toe.  Concern for infectious etiology including cellulitis, diabetic wound infection.  Given progression of wound, with lymphangitic streaking streaking, and pain that is palpable up to the mid shin, CT scan of lower extremity ordered.  Initial vital signs have no SIRS criteria.  Septic order set out of concern for infectious cause given great toe appearance.  Second concern of right shoulder pain after moving heavy boxes is present.  Patient had positive empty can test of right shoulder.  X-ray is unremarkable for acute  osseous abnormality.  Likely has ligamentous injury on exam.  No concern for infectious etiology as there is no erythema, no gross effusion, no pain out of proportion.   Patient has no leukocytosis, no anemia. Patient noted to have a hyperglycemia. Discussed this with the patient and reports when she is stressed it goes up. Patient denies misses in medications. Patient has a correct sodium of 137. No evidence of acidosis on CMP. VBG and Beta hydrox ordered. Fluids provided. No current ECHO available per records review.   Patient has no sirs criteria met. Due to allergies, clindamycin chosen.   Patient signed out to Dr. Hunt with plan for admission pending CT results. Additional pending results is a troponin, beta hydrox, vbg.     Amount and/or Complexity of Data Reviewed  Labs: ordered.  Radiology: ordered.    Risk  Prescription drug management.             Disposition  Final diagnoses:   None     ED Disposition       None          Follow-up Information    None         Patient's Medications   Discharge Prescriptions    No medications on file       No discharge procedures on file.    PDMP Review       None            ED Provider  Electronically Signed by             PATI Villa  05/15/24 7608

## 2024-05-15 NOTE — ASSESSMENT & PLAN NOTE
Blood sugar on  however she did not take her insulin today  Give 8 units of Humalog prior to discharge  Resume home antihyperglycemic regimen

## 2024-05-15 NOTE — ASSESSMENT & PLAN NOTE
With 2 days of swelling of right foot.  No fevers or SIRS criteria.  Patient has a blood blister in the first and second web of her right toe with some lymphangitic streaking  Patient does not want to be hospitalized  Recommend starting the patient on 600 mg of clindamycin 3 times daily for total of 7 days  Recommend outpatient podiatry referral  Return to ER criteria explained in depth with the patient

## 2024-05-15 NOTE — ED CARE HANDOFF
Emergency Department Sign Out Note        Sign out and transfer of care from Shonda Cai. See Separate Emergency Department note.     The patient, Jackie Cunningham, was evaluated by the previous provider for right foot cellulitis.    Workup Completed:  Patient given dose of clindamycin.  Awaiting CT result.    ED Course / Workup Pending (followup):  Patient refused admission to the hospital.  Evaluated by KEL in the ED.  I&D performed at bedside.  Wound culture sent.  Podiatry follow up.                                  ED Course as of 05/15/24 2017   Wed May 15, 2024   1904 Patient refusing admission to the hospital.  SLIM consulted.     Incision and drain    Date/Time: 5/15/2024 8:16 PM    Performed by: Cailin Hunt MD  Authorized by: Cailin Hunt MD  Universal Protocol:  Consent: Verbal consent obtained.  Risks and benefits: risks, benefits and alternatives were discussed  Consent given by: patient  Radiology Images displayed and confirmed. If images not available, report reviewed: imaging studies available  Required items: required blood products, implants, devices, and special equipment available  Patient identity confirmed: verbally with patient and arm band    Patient location:  ED  Location:     Type:  Abscess    Size:  1 cm    Location:  Lower extremity    Lower extremity location:  R big toe  Pre-procedure details:     Skin preparation:  Chloraprep  Anesthesia (see MAR for exact dosages):     Anesthesia method:  Local infiltration    Local anesthetic:  Lidocaine 1% w/o epi  Procedure details:     Complexity:  Simple    Needle aspiration: no      Incision types:  Stab incision    Scalpel blade:  11    Approach:  Open    Incision depth:  Subcutaneous    Wound management:  Irrigated with saline    Drainage:  Purulent    Drainage amount:  Moderate    Wound treatment:  Wound left open    Packing materials:  None  Post-procedure details:     Patient tolerance of procedure:  Tolerated  well, no immediate complications    Medical Decision Making  Amount and/or Complexity of Data Reviewed  Labs: ordered.  Radiology: ordered and independent interpretation performed.    Risk  Prescription drug management.            Disposition  Final diagnoses:   Cellulitis of right foot   Hyperglycemia   Right shoulder pain     Time reflects when diagnosis was documented in both MDM as applicable and the Disposition within this note       Time User Action Codes Description Comment    5/15/2024  6:59 PM Cailin Hunt Add [L03.115] Cellulitis of right foot     5/15/2024  6:59 PM Cailin Hunt Add [R73.9] Hyperglycemia     5/15/2024  7:37 PM Cailin Hunt Add [M25.511] Right shoulder pain           ED Disposition       ED Disposition   Discharge    Condition   Stable    Date/Time   Wed May 15, 2024  7:37 PM    Comment   Jackie Cunningham discharge to home/self care.                   Follow-up Information       Follow up With Specialties Details Why Contact Info Additional Information    Zack Lam DPM Podiatry, Wound Care Schedule an appointment as soon as possible for a visit in 1 week for re-evaluation [address] [place] [address]  [phone]        [facility] Emergency Medicine Go to  If symptoms worsen [address] [facility] [address] [address] [phone] [facility], [address] [place] [id number]          Patient's Medications   Discharge Prescriptions    CLINDAMYCIN (CLEOCIN) 300 MG CAPSULE    Take 1 capsule (300 mg total) by mouth every 6 (six) hours for 7 days       Start Date: 5/15/2024 End Date: 5/22/2024       Order Dose: 300 mg       Quantity: 28 capsule    Refills: 0            ED Provider  Electronically Signed by     Cailin Hunt MD  05/15/24 2017

## 2024-05-15 NOTE — CONSULTS
[facility]  Consult  Name: Jackie Cunningham 78 y.o. female I MRN: [id number]  Unit/Bed#: ED 07 I Date of Admission: 5/15/2024   Date of Service: 5/15/2024 I Hospital Day: 0    Consult to internal medicine  Consult performed by: Gagan Stein PA-C  Consult ordered by: Cailin Hunt MD          Assessment & Plan   Depression  Assessment & Plan  Resume home antidepressives    Primary hypertension  Assessment & Plan  Resume home antihypertensive regimen including Lasix, losartan, atenolol    Hyperglycemia  Assessment & Plan  Blood sugar on  however she did not take her insulin today  Give 8 units of Humalog prior to discharge  Resume home antihyperglycemic regimen    * Cellulitis of right foot  Assessment & Plan  With 2 days of swelling of right foot.  No fevers or SIRS criteria.  Patient has a blood blister in the first and second web of her right toe with some lymphangitic streaking  Patient does not want to be hospitalized  Recommend starting the patient on 300 mg of clindamycin 3 times daily for total of 7 days  Recommend outpatient podiatry referral  Return to ER criteria explained in depth with the patient             Recommendations for Discharge:  Give 8 units of Humulin subcutaneously prior to discharge  Start p.o. clindamycin 300 mg 3 times daily for 7 days    Counseling / Coordination of Care Time: 30 minutes.  Greater than 50% of total time spent on patient counseling and coordination of care.    Collaboration of Care: Were Recommendations Directly Discussed with Primary Treatment Team? - Yes     History of Present Illness:    Jackie Cunningham is a 78 y.o. female presenting with 2 days of right foot pain.  No fevers noted.  Elevated blood glucose upon admission however she did not take her home insulin.  Patient does not want to be hospitalized and would rather go home.     Review of Systems:    Review of Systems   Constitutional:  Negative for activity change,  appetite change, chills, fatigue and fever.   HENT:  Negative for congestion, rhinorrhea, sinus pressure and sore throat.    Eyes:  Negative for photophobia, pain and visual disturbance.   Respiratory:  Negative for cough, shortness of breath and wheezing.    Cardiovascular:  Negative for chest pain, palpitations and leg swelling.   Gastrointestinal:  Negative for abdominal distention, abdominal pain, constipation, diarrhea, nausea and vomiting.   Endocrine: Negative for cold intolerance, heat intolerance, polydipsia and polyuria.   Genitourinary:  Negative for difficulty urinating, dysuria, flank pain, frequency and hematuria.   Musculoskeletal:  Negative for arthralgias, back pain and joint swelling.   Skin:  Positive for wound (R toe). Negative for color change, pallor and rash.   Allergic/Immunologic: Negative.    Neurological:  Negative for dizziness, syncope, weakness, light-headedness and headaches.   Hematological: Negative.    Psychiatric/Behavioral: Negative.         Past Medical and Surgical History:     Past Medical History:   Diagnosis Date    Atrial fibrillation (HCC)     Diabetes mellitus (HCC)     DM 2    GERD (gastroesophageal reflux disease)     Hypertension        Past Surgical History:   Procedure Laterality Date    ABLATION OF DYSRHYTHMIC FOCUS      APPENDECTOMY      CHOLECYSTECTOMY      COLONOSCOPY      UPPER GASTROINTESTINAL ENDOSCOPY         Meds/Allergies:    PTA meds:   Prior to Admission Medications   Prescriptions Last Dose Informant Patient Reported? Taking?   Cholecalciferol 125 MCG (5000 UT) capsule  Self Yes No   Sig: every 24 hours   Jardiance 25 MG TABS  Self Yes No   Sig: Take 25 mg by mouth daily   Meclizine HCl 25 MG CHEW  Self Yes No   Si tablet Orally Three times a day as needed for 30 days   Ozempic, 1 MG/DOSE, 4 MG/3ML injection pen  Self Yes No   amiodarone 200 mg tablet  Self Yes No   Sig: Take 200 mg by mouth daily   apixaban (Eliquis) 5 mg  Self Yes No   Sig: take 1  tablet by mouth twice a day 30   atenolol (TENORMIN) 25 mg tablet  Self Yes No   famotidine (PEPCID) 40 MG tablet  Self Yes No   Sig: every 24 hours   furosemide (LASIX) 20 mg tablet  Self Yes No   Sig: TAKE ONE TABLET BY MOUTH EVERY DAY for 90   gabapentin (NEURONTIN) 100 mg capsule  Self Yes No   Si capsule every 24 hours   levothyroxine 25 mcg tablet  Self Yes No   losartan (COZAAR) 100 MG tablet  Self Yes No   Sig: Take 100 mg by mouth daily   nitroglycerin (NITROSTAT) 0.4 mg SL tablet  Self Yes No   Si tab under tongue every 5 mins as directed for chest pain Sublingual   pantoprazole (PROTONIX) 40 mg tablet  Self Yes No   Sig: every 24 hours   polyethylene glycol (GLYCOLAX) 17 GM/SCOOP powder   No No   Sig: Take 17 g by mouth daily   semaglutide, 0.25 or 0.5 mg/dose, (Ozempic, 0.25 or 0.5 MG/DOSE,) 2 mg/1.5 mL injection pen  Self Yes No   Sig: Inject under the skin once a week   sertraline (ZOLOFT) 25 mg tablet  Self Yes No   Sig: Take 25 mg by mouth daily      Facility-Administered Medications: None       Allergies:   Allergies   Allergen Reactions    Cephalexin Vomiting and Itching    Codeine Vomiting and Hives    Morphine GI Intolerance    Amoxicillin Dizziness and Rash     Other Reaction(s): HANDS ITCHING       Social History:     Marital Status: Unknown    Substance Use History:   Social History     Substance and Sexual Activity   Alcohol Use Never     Social History     Tobacco Use   Smoking Status Former    Types: Cigarettes   Smokeless Tobacco Never     Social History     Substance and Sexual Activity   Drug Use Not on file       Family History:    non-contributory    Physical Exam:     Vitals:   Blood Pressure: 128/74 (05/15/24 1442)  Pulse: 86 (05/15/24 144)  Temperature: 97.9 °F (36.6 °C) (05/15/24 144)  Temp Source: Temporal (05/15/24 144)  Respirations: 18 (05/15/24 144)  SpO2: 97 % (05/15/24 144)    Physical Exam  Vitals and nursing note reviewed.   Constitutional:       General: She  "is not in acute distress.     Appearance: Normal appearance.   HENT:      Head: Normocephalic.      Mouth/Throat:      Mouth: Mucous membranes are moist.   Eyes:      General: No scleral icterus.     Pupils: Pupils are equal, round, and reactive to light.   Cardiovascular:      Rate and Rhythm: Normal rate and regular rhythm.      Heart sounds: No murmur heard.  Pulmonary:      Effort: Pulmonary effort is normal. No respiratory distress.      Breath sounds: Normal breath sounds. No wheezing, rhonchi or rales.   Abdominal:      General: Bowel sounds are normal. There is no distension.      Palpations: Abdomen is soft.      Tenderness: There is no abdominal tenderness.   Musculoskeletal:         General: No swelling.      Right lower leg: No edema.      Left lower leg: No edema.   Skin:     Capillary Refill: Capillary refill takes less than 2 seconds.      Findings: Erythema (R foot inbetween 1st and 2nd toe) present.   Neurological:      General: No focal deficit present.      Mental Status: She is alert and oriented to person, place, and time. Mental status is at baseline.         Additional Data:     Lab Results: I have personally reviewed pertinent reports.      Results from last 7 days   Lab Units 05/15/24  1627   WBC Thousand/uL 7.58   HEMOGLOBIN g/dL 13.4   HEMATOCRIT % 39.9   PLATELETS Thousands/uL 199   SEGS PCT % 75   LYMPHO PCT % 15   MONO PCT % 8   EOS PCT % 1     Results from last 7 days   Lab Units 05/15/24  1627   SODIUM mmol/L 131*   POTASSIUM mmol/L 4.0   CHLORIDE mmol/L 92*   CO2 mmol/L 30   BUN mg/dL 13   CREATININE mg/dL 1.00   ANION GAP mmol/L 9   CALCIUM mg/dL 9.2   ALBUMIN g/dL 4.0   TOTAL BILIRUBIN mg/dL 1.62*   ALK PHOS U/L 97   ALT U/L 14   AST U/L 12*   GLUCOSE RANDOM mg/dL 454*     Results from last 7 days   Lab Units 05/15/24  1627   INR  1.19         No results found for: \"HGBA1C\"      Results from last 7 days   Lab Units 05/15/24  1627   LACTIC ACID mmol/L 2.0   PROCALCITONIN ng/ml 0.06 "       Imaging: I have personally reviewed pertinent reports.      XR chest 1 view portable   ED Interpretation by PATI Villa (05/15 1814)   No acute cardiopulmonary disease      CT lower extremity w contrast right   Final Result by Gus Beckett MD (05/15 1839)      Shallow soft tissue ulceration between the first and second digits with 1.0 cm rim-enhancing collection in the first webspace.         Workstation performed: [id number]         XR foot 3+ vw right   ED Interpretation by PATI Villa (05/15 1813)   No obvious fracture or gas pattern      XR shoulder 2+ views RIGHT   ED Interpretation by PATI Villa (05/15 1813)   No acute osseous abnormality           EKG, Pathology, and Other Studies Reviewed on Admission:   Prior pertinent studies and records reviewed in [facility]Care Everywhere.    ** Please Note: This note has been constructed using a voice recognition system. **

## 2024-05-16 ENCOUNTER — APPOINTMENT (EMERGENCY)
Dept: RADIOLOGY | Facility: HOSPITAL | Age: 79
DRG: 603 | End: 2024-05-16
Payer: MEDICARE

## 2024-05-16 ENCOUNTER — HOSPITAL ENCOUNTER (INPATIENT)
Facility: HOSPITAL | Age: 79
LOS: 4 days | Discharge: HOME | DRG: 603 | End: 2024-05-20
Attending: EMERGENCY MEDICINE | Admitting: INTERNAL MEDICINE
Payer: MEDICARE

## 2024-05-16 DIAGNOSIS — L02.611 CELLULITIS AND ABSCESS OF TOE OF RIGHT FOOT: Primary | ICD-10-CM

## 2024-05-16 DIAGNOSIS — E11.621 TYPE 2 DIABETES MELLITUS WITH FOOT ULCER, WITH LONG-TERM CURRENT USE OF INSULIN (HCC): ICD-10-CM

## 2024-05-16 DIAGNOSIS — I48.0 PAF (PAROXYSMAL ATRIAL FIBRILLATION) (HCC): ICD-10-CM

## 2024-05-16 DIAGNOSIS — L97.509 TYPE 2 DIABETES MELLITUS WITH FOOT ULCER, WITH LONG-TERM CURRENT USE OF INSULIN (HCC): ICD-10-CM

## 2024-05-16 DIAGNOSIS — Z79.4 TYPE 2 DIABETES MELLITUS WITH FOOT ULCER, WITH LONG-TERM CURRENT USE OF INSULIN (HCC): ICD-10-CM

## 2024-05-16 DIAGNOSIS — L02.619 CELLULITIS AND ABSCESS OF FOOT: ICD-10-CM

## 2024-05-16 DIAGNOSIS — L03.119 CELLULITIS AND ABSCESS OF FOOT: ICD-10-CM

## 2024-05-16 DIAGNOSIS — L03.115 CELLULITIS OF RIGHT FOOT: ICD-10-CM

## 2024-05-16 DIAGNOSIS — L03.031 CELLULITIS AND ABSCESS OF TOE OF RIGHT FOOT: Primary | ICD-10-CM

## 2024-05-16 PROBLEM — I42.2 HYPERTROPHIC CARDIOMYOPATHY (HCC): Status: ACTIVE | Noted: 2023-10-26

## 2024-05-16 LAB
ANION GAP SERPL CALCULATED.3IONS-SCNC: 6 MMOL/L (ref 4–13)
APTT PPP: 34 SECONDS (ref 23–37)
ATRIAL RATE: 81 BPM
BASOPHILS # BLD AUTO: 0.03 THOUSANDS/ÂΜL (ref 0–0.1)
BASOPHILS NFR BLD AUTO: 0 % (ref 0–1)
BUN SERPL-MCNC: 10 MG/DL (ref 5–25)
CALCIUM SERPL-MCNC: 9 MG/DL (ref 8.4–10.2)
CHLORIDE SERPL-SCNC: 96 MMOL/L (ref 96–108)
CO2 SERPL-SCNC: 30 MMOL/L (ref 21–32)
CREAT SERPL-MCNC: 0.9 MG/DL (ref 0.6–1.3)
CRP SERPL QL: 126.9 MG/L
EOSINOPHIL # BLD AUTO: 0.07 THOUSAND/ÂΜL (ref 0–0.61)
EOSINOPHIL NFR BLD AUTO: 1 % (ref 0–6)
ERYTHROCYTE [DISTWIDTH] IN BLOOD BY AUTOMATED COUNT: 11.9 % (ref 11.6–15.1)
ERYTHROCYTE [SEDIMENTATION RATE] IN BLOOD: 69 MM/HOUR (ref 0–29)
GFR SERPL CREATININE-BSD FRML MDRD: 62 ML/MIN/1.73SQ M
GLUCOSE SERPL-MCNC: 286 MG/DL (ref 65–140)
GLUCOSE SERPL-MCNC: 289 MG/DL (ref 65–140)
HCT VFR BLD AUTO: 35.7 % (ref 34.8–46.1)
HGB BLD-MCNC: 11.8 G/DL (ref 11.5–15.4)
IMM GRANULOCYTES # BLD AUTO: 0.03 THOUSAND/UL (ref 0–0.2)
IMM GRANULOCYTES NFR BLD AUTO: 0 % (ref 0–2)
INR PPP: 1.1 (ref 0.84–1.19)
LACTATE SERPL-SCNC: 1.5 MMOL/L (ref 0.5–2)
LYMPHOCYTES # BLD AUTO: 1.85 THOUSANDS/ÂΜL (ref 0.6–4.47)
LYMPHOCYTES NFR BLD AUTO: 25 % (ref 14–44)
MCH RBC QN AUTO: 29.4 PG (ref 26.8–34.3)
MCHC RBC AUTO-ENTMCNC: 33.1 G/DL (ref 31.4–37.4)
MCV RBC AUTO: 89 FL (ref 82–98)
MONOCYTES # BLD AUTO: 0.61 THOUSAND/ÂΜL (ref 0.17–1.22)
MONOCYTES NFR BLD AUTO: 8 % (ref 4–12)
NEUTROPHILS # BLD AUTO: 4.81 THOUSANDS/ÂΜL (ref 1.85–7.62)
NEUTS SEG NFR BLD AUTO: 66 % (ref 43–75)
NRBC BLD AUTO-RTO: 0 /100 WBCS
P AXIS: 64 DEGREES
PLATELET # BLD AUTO: 197 THOUSANDS/UL (ref 149–390)
PMV BLD AUTO: 10.4 FL (ref 8.9–12.7)
POTASSIUM SERPL-SCNC: 3.8 MMOL/L (ref 3.5–5.3)
PR INTERVAL: 176 MS
PROCALCITONIN SERPL-MCNC: <0.05 NG/ML
PROTHROMBIN TIME: 14.7 SECONDS (ref 11.6–14.5)
QRS AXIS: 2 DEGREES
QRSD INTERVAL: 82 MS
QT INTERVAL: 398 MS
QTC INTERVAL: 462 MS
RBC # BLD AUTO: 4.01 MILLION/UL (ref 3.81–5.12)
SODIUM SERPL-SCNC: 132 MMOL/L (ref 135–147)
T WAVE AXIS: 60 DEGREES
VENTRICULAR RATE: 81 BPM
WBC # BLD AUTO: 7.4 THOUSAND/UL (ref 4.31–10.16)

## 2024-05-16 PROCEDURE — 80048 BASIC METABOLIC PNL TOTAL CA: CPT

## 2024-05-16 PROCEDURE — 85610 PROTHROMBIN TIME: CPT

## 2024-05-16 PROCEDURE — 86140 C-REACTIVE PROTEIN: CPT

## 2024-05-16 PROCEDURE — 83036 HEMOGLOBIN GLYCOSYLATED A1C: CPT | Performed by: PHYSICIAN ASSISTANT

## 2024-05-16 PROCEDURE — 85025 COMPLETE CBC W/AUTO DIFF WBC: CPT

## 2024-05-16 PROCEDURE — 96375 TX/PRO/DX INJ NEW DRUG ADDON: CPT

## 2024-05-16 PROCEDURE — 99223 1ST HOSP IP/OBS HIGH 75: CPT | Performed by: PHYSICIAN ASSISTANT

## 2024-05-16 PROCEDURE — 36415 COLL VENOUS BLD VENIPUNCTURE: CPT

## 2024-05-16 PROCEDURE — 93010 ELECTROCARDIOGRAM REPORT: CPT | Performed by: INTERNAL MEDICINE

## 2024-05-16 PROCEDURE — 96365 THER/PROPH/DIAG IV INF INIT: CPT

## 2024-05-16 PROCEDURE — 84145 PROCALCITONIN (PCT): CPT

## 2024-05-16 PROCEDURE — 83605 ASSAY OF LACTIC ACID: CPT

## 2024-05-16 PROCEDURE — 73630 X-RAY EXAM OF FOOT: CPT

## 2024-05-16 PROCEDURE — 99024 POSTOP FOLLOW-UP VISIT: CPT

## 2024-05-16 PROCEDURE — 85652 RBC SED RATE AUTOMATED: CPT

## 2024-05-16 PROCEDURE — 82948 REAGENT STRIP/BLOOD GLUCOSE: CPT

## 2024-05-16 PROCEDURE — 87040 BLOOD CULTURE FOR BACTERIA: CPT

## 2024-05-16 PROCEDURE — 85730 THROMBOPLASTIN TIME PARTIAL: CPT

## 2024-05-16 PROCEDURE — 99285 EMERGENCY DEPT VISIT HI MDM: CPT

## 2024-05-16 RX ORDER — HEPARIN SODIUM 1000 [USP'U]/ML
4000 INJECTION, SOLUTION INTRAVENOUS; SUBCUTANEOUS EVERY 6 HOURS PRN
Status: DISCONTINUED | OUTPATIENT
Start: 2024-05-16 | End: 2024-05-17

## 2024-05-16 RX ORDER — HYDROMORPHONE HCL IN WATER/PF 6 MG/30 ML
0.2 PATIENT CONTROLLED ANALGESIA SYRINGE INTRAVENOUS EVERY 6 HOURS PRN
Status: DISCONTINUED | OUTPATIENT
Start: 2024-05-17 | End: 2024-05-20 | Stop reason: HOSPADM

## 2024-05-16 RX ORDER — ONDANSETRON 2 MG/ML
4 INJECTION INTRAMUSCULAR; INTRAVENOUS EVERY 6 HOURS PRN
Status: DISCONTINUED | OUTPATIENT
Start: 2024-05-16 | End: 2024-05-20 | Stop reason: HOSPADM

## 2024-05-16 RX ORDER — HEPARIN SODIUM 10000 [USP'U]/100ML
3-20 INJECTION, SOLUTION INTRAVENOUS
Status: DISCONTINUED | OUTPATIENT
Start: 2024-05-16 | End: 2024-05-17

## 2024-05-16 RX ORDER — SENNOSIDES 8.6 MG
1 TABLET ORAL
Status: DISCONTINUED | OUTPATIENT
Start: 2024-05-16 | End: 2024-05-20 | Stop reason: HOSPADM

## 2024-05-16 RX ORDER — ONDANSETRON 2 MG/ML
4 INJECTION INTRAMUSCULAR; INTRAVENOUS ONCE
Status: COMPLETED | OUTPATIENT
Start: 2024-05-16 | End: 2024-05-16

## 2024-05-16 RX ORDER — HEPARIN SODIUM 1000 [USP'U]/ML
2000 INJECTION, SOLUTION INTRAVENOUS; SUBCUTANEOUS EVERY 6 HOURS PRN
Status: DISCONTINUED | OUTPATIENT
Start: 2024-05-16 | End: 2024-05-17

## 2024-05-16 RX ORDER — ACETAMINOPHEN 325 MG/1
650 TABLET ORAL EVERY 6 HOURS PRN
Status: DISCONTINUED | OUTPATIENT
Start: 2024-05-16 | End: 2024-05-20 | Stop reason: HOSPADM

## 2024-05-16 RX ORDER — MAGNESIUM HYDROXIDE/ALUMINUM HYDROXICE/SIMETHICONE 120; 1200; 1200 MG/30ML; MG/30ML; MG/30ML
30 SUSPENSION ORAL EVERY 6 HOURS PRN
Status: DISCONTINUED | OUTPATIENT
Start: 2024-05-16 | End: 2024-05-20 | Stop reason: HOSPADM

## 2024-05-16 RX ORDER — LEVOTHYROXINE SODIUM 0.1 MG/1
100 TABLET ORAL
Status: DISCONTINUED | OUTPATIENT
Start: 2024-05-17 | End: 2024-05-20 | Stop reason: HOSPADM

## 2024-05-16 RX ORDER — FAMOTIDINE 20 MG/1
40 TABLET, FILM COATED ORAL EVERY OTHER DAY
Status: DISCONTINUED | OUTPATIENT
Start: 2024-05-18 | End: 2024-05-20 | Stop reason: HOSPADM

## 2024-05-16 RX ORDER — INSULIN LISPRO 100 [IU]/ML
1-6 INJECTION, SOLUTION INTRAVENOUS; SUBCUTANEOUS EVERY 6 HOURS SCHEDULED
Status: DISCONTINUED | OUTPATIENT
Start: 2024-05-16 | End: 2024-05-18

## 2024-05-16 RX ORDER — AMIODARONE HYDROCHLORIDE 200 MG/1
200 TABLET ORAL DAILY
Status: DISCONTINUED | OUTPATIENT
Start: 2024-05-17 | End: 2024-05-20 | Stop reason: HOSPADM

## 2024-05-16 RX ORDER — CEFEPIME HYDROCHLORIDE 2 G/50ML
2000 INJECTION, SOLUTION INTRAVENOUS EVERY 12 HOURS
Status: DISCONTINUED | OUTPATIENT
Start: 2024-05-17 | End: 2024-05-17

## 2024-05-16 RX ORDER — GABAPENTIN 100 MG/1
100 CAPSULE ORAL DAILY
Status: DISCONTINUED | OUTPATIENT
Start: 2024-05-17 | End: 2024-05-20 | Stop reason: HOSPADM

## 2024-05-16 RX ORDER — SERTRALINE HYDROCHLORIDE 25 MG/1
25 TABLET, FILM COATED ORAL DAILY
Status: DISCONTINUED | OUTPATIENT
Start: 2024-05-17 | End: 2024-05-20 | Stop reason: HOSPADM

## 2024-05-16 RX ORDER — HYDROMORPHONE HCL/PF 1 MG/ML
0.5 SYRINGE (ML) INJECTION ONCE
Status: COMPLETED | OUTPATIENT
Start: 2024-05-16 | End: 2024-05-16

## 2024-05-16 RX ORDER — OXYCODONE HYDROCHLORIDE 5 MG/1
5 TABLET ORAL EVERY 6 HOURS PRN
Status: DISCONTINUED | OUTPATIENT
Start: 2024-05-16 | End: 2024-05-20 | Stop reason: HOSPADM

## 2024-05-16 RX ORDER — CEFEPIME HYDROCHLORIDE 2 G/50ML
2000 INJECTION, SOLUTION INTRAVENOUS ONCE
Status: COMPLETED | OUTPATIENT
Start: 2024-05-16 | End: 2024-05-16

## 2024-05-16 RX ORDER — ATENOLOL 25 MG/1
25 TABLET ORAL DAILY
Status: DISCONTINUED | OUTPATIENT
Start: 2024-05-17 | End: 2024-05-20 | Stop reason: HOSPADM

## 2024-05-16 RX ORDER — PANTOPRAZOLE SODIUM 40 MG/1
40 TABLET, DELAYED RELEASE ORAL DAILY
Status: DISCONTINUED | OUTPATIENT
Start: 2024-05-17 | End: 2024-05-20 | Stop reason: HOSPADM

## 2024-05-16 RX ADMIN — HYDROMORPHONE HYDROCHLORIDE 0.5 MG: 1 INJECTION, SOLUTION INTRAMUSCULAR; INTRAVENOUS; SUBCUTANEOUS at 19:06

## 2024-05-16 RX ADMIN — VANCOMYCIN HYDROCHLORIDE 1500 MG: 500 INJECTION, POWDER, LYOPHILIZED, FOR SOLUTION INTRAVENOUS at 20:46

## 2024-05-16 RX ADMIN — HEPARIN SODIUM 12 UNITS/KG/HR: 10000 INJECTION, SOLUTION INTRAVENOUS at 20:57

## 2024-05-16 RX ADMIN — INSULIN LISPRO 4 UNITS: 100 INJECTION, SOLUTION INTRAVENOUS; SUBCUTANEOUS at 21:19

## 2024-05-16 RX ADMIN — ONDANSETRON 4 MG: 2 INJECTION INTRAMUSCULAR; INTRAVENOUS at 19:06

## 2024-05-16 RX ADMIN — CEFEPIME HYDROCHLORIDE 2000 MG: 2 INJECTION, SOLUTION INTRAVENOUS at 18:36

## 2024-05-16 NOTE — ED PROVIDER NOTES
History  Chief Complaint   Patient presents with    Wound Check     Pt to er with reports that she was in the er yesterday afternoon and had a wound lanced between her toes. Was told that she should get admitted to watch for infection and she denied. States that her foot is now having increased pain.      The patient is a 78-year-old female presenting to the ER for worsening right foot pain.  The patient was seen yesterday for 2 days of right great toe and foot pain.  At that time she had x-rays and CT imaging done which revealed an abscess with surrounding cellulitis.  The abscess was drained in the ED and she was given clindamycin.  She was offered admission given the acute progression of the wound and her hyperglycemia, however at that time she wanted to go home.  She returns today as the pain has acutely worsened, as well as progressive redness/warmth/swelling.  She denies associated fevers or chills.  She has been taking the clindamycin as directed.      History provided by:  Patient and caregiver   used: No        Prior to Admission Medications   Prescriptions Last Dose Informant Patient Reported? Taking?   Cholecalciferol 25 MCG (1000 UT) capsule 2024 Self Yes Yes   Sig: Take 1,000 Units by mouth every 24 hours   Meclizine HCl 25 MG CHEW  Self Yes No   Si tablet Orally Three times a day as needed for 30 days   Ozempic, 1 MG/DOSE, 4 MG/3ML injection pen 2024 Self Yes No   amiodarone 200 mg tablet 2024 Self Yes Yes   Sig: Take 200 mg by mouth daily   apixaban (Eliquis) 5 mg 2024 Self Yes Yes   Sig: take 1 tablet by mouth twice a day 30   atenolol (TENORMIN) 25 mg tablet 2024 Self Yes Yes   Sig: Take 25 mg by mouth daily   clindamycin (CLEOCIN) 300 MG capsule 2024  No Yes   Sig: Take 1 capsule (300 mg total) by mouth every 6 (six) hours for 7 days   famotidine (PEPCID) 40 MG tablet 2024 Self Yes Yes   Sig: Take 40 mg by mouth every other day   furosemide  (LASIX) 20 mg tablet 2024 Self Yes Yes   Sig: TAKE ONE TABLET BY MOUTH EVERY DAY for 90   gabapentin (NEURONTIN) 100 mg capsule 2024 Self Yes Yes   Sig: Take 1 capsule by mouth daily   levothyroxine 25 mcg tablet 2024 Self Yes Yes   Sig: Take 25 mcg by mouth daily   losartan (COZAAR) 100 MG tablet 2024 Self Yes Yes   Sig: Take 100 mg by mouth daily   nitroglycerin (NITROSTAT) 0.4 mg SL tablet  Self Yes No   Si tab under tongue every 5 mins as directed for chest pain Sublingual   pantoprazole (PROTONIX) 40 mg tablet 2024 Self Yes Yes   Sig: Take 40 mg by mouth daily   polyethylene glycol (GLYCOLAX) 17 GM/SCOOP powder   No No   Sig: Take 17 g by mouth daily   semaglutide, 0.25 or 0.5 mg/dose, (Ozempic, 0.25 or 0.5 MG/DOSE,) 2 mg/1.5 mL injection pen  Self Yes No   Sig: Inject under the skin once a week   sertraline (ZOLOFT) 25 mg tablet 2024 Self Yes Yes   Sig: Take 25 mg by mouth daily      Facility-Administered Medications: None       Past Medical History:   Diagnosis Date    Atrial fibrillation (HCC)     Diabetes mellitus (HCC)     DM 2    GERD (gastroesophageal reflux disease)     Hypertension        Past Surgical History:   Procedure Laterality Date    ABLATION OF DYSRHYTHMIC FOCUS      APPENDECTOMY      CHOLECYSTECTOMY      COLONOSCOPY      UPPER GASTROINTESTINAL ENDOSCOPY         Family History   Problem Relation Age of Onset    Colon cancer Neg Hx     Colon polyps Neg Hx      I have reviewed and agree with the history as documented.    E-Cigarette/Vaping    E-Cigarette Use Never User      E-Cigarette/Vaping Substances     Social History     Tobacco Use    Smoking status: Former     Types: Cigarettes    Smokeless tobacco: Never   Vaping Use    Vaping status: Never Used   Substance Use Topics    Alcohol use: Never       Review of Systems   Constitutional:  Negative for chills and fever.   Skin:  Positive for color change and wound.        Right great toe wound and  redness/warmth/swelling of the right foot   Neurological:  Positive for numbness (Chronic bilateral lower legs due to neuropathy).   All other systems reviewed and are negative.      Physical Exam  Physical Exam  Vitals and nursing note reviewed.   Constitutional:       General: She is not in acute distress.     Appearance: Normal appearance. She is normal weight. She is not ill-appearing, toxic-appearing or diaphoretic.   HENT:      Head: Normocephalic and atraumatic.      Nose: Nose normal.      Mouth/Throat:      Mouth: Mucous membranes are moist.      Pharynx: Oropharynx is clear.   Eyes:      Extraocular Movements: Extraocular movements intact.      Conjunctiva/sclera: Conjunctivae normal.   Cardiovascular:      Rate and Rhythm: Normal rate.   Pulmonary:      Effort: Pulmonary effort is normal. No respiratory distress.   Musculoskeletal:         General: Normal range of motion.      Cervical back: Normal range of motion and neck supple.   Feet:      Right foot:      Skin integrity: Erythema and warmth present.      Left foot:      Skin integrity: Skin integrity normal.      Comments: Wound to the webspace between the first and second toes with active moderate amount of yellow to tan purulent drainage.  No crepitus.  Erythema, warmth, edema of right foot.  Skin:     General: Skin is warm and dry.      Capillary Refill: Capillary refill takes less than 2 seconds.   Neurological:      General: No focal deficit present.      Mental Status: She is alert and oriented to person, place, and time. Mental status is at baseline.                 Vital Signs  ED Triage Vitals   Temperature Pulse Respirations Blood Pressure SpO2   05/16/24 1635 05/16/24 1635 05/16/24 1635 05/16/24 1635 05/16/24 1635   97.5 °F (36.4 °C) 88 18 139/67 95 %      Temp Source Heart Rate Source Patient Position - Orthostatic VS BP Location FiO2 (%)   05/16/24 1635 05/16/24 1635 05/16/24 1635 05/16/24 1635 --   Temporal Monitor Sitting Right arm        Pain Score       05/16/24 1859       10 - Worst Possible Pain           Vitals:    05/16/24 1915 05/16/24 1930 05/16/24 1945 05/16/24 2000   BP: 138/66 128/63     Pulse: 76 77 77 79   Patient Position - Orthostatic VS:             Visual Acuity      ED Medications  Medications   vancomycin (VANCOCIN) 1500 mg in sodium chloride 0.9% 250 mL IVPB (has no administration in time range)   cefepime (MAXIPIME) IVPB (premix in dextrose) 2,000 mg 50 mL ( Intravenous Restarted 5/16/24 1906)   HYDROmorphone (DILAUDID) injection 0.5 mg (0.5 mg Intravenous Given 5/16/24 1906)   ondansetron (ZOFRAN) injection 4 mg (4 mg Intravenous Given 5/16/24 1906)       Diagnostic Studies  Results Reviewed       Procedure Component Value Units Date/Time    Protime-INR [[id number]]  (Abnormal) Collected: 05/16/24 1836    Lab Status: Final result Specimen: Blood from Arm, Left Updated: 05/16/24 1930     Protime 14.7 seconds      INR 1.10    APTT [[id number]]  (Normal) Collected: 05/16/24 1836    Lab Status: Final result Specimen: Blood from Arm, Left Updated: 05/16/24 1930     PTT 34 seconds     Sedimentation rate, automated [[id number]]  (Abnormal) Collected: 05/16/24 1836    Lab Status: Final result Specimen: Blood from Arm, Left Updated: 05/16/24 1914     Sed Rate 69 mm/hour     Procalcitonin [[id number]]  (Normal) Collected: 05/16/24 1836    Lab Status: Final result Specimen: Blood from Arm, Left Updated: 05/16/24 1911     Procalcitonin <0.05 ng/ml     Basic metabolic panel [[id number]]  (Abnormal) Collected: 05/16/24 1836    Lab Status: Final result Specimen: Blood from Arm, Left Updated: 05/16/24 1901     Sodium 132 mmol/L      Potassium 3.8 mmol/L      Chloride 96 mmol/L      CO2 30 mmol/L      ANION GAP 6 mmol/L      BUN 10 mg/dL      Creatinine 0.90 mg/dL      Glucose 289 mg/dL      Calcium 9.0 mg/dL      eGFR 62 ml/min/1.73sq m     Narrative:      National Kid[employer] Disease Foundation guidelines for Chronic Kidney Disease (CKD):     Stage 1  with normal or high GFR (GFR > 90 mL/min/1.73 square meters)    Stage 2 Mild CKD (GFR = 60-89 mL/min/1.73 square meters)    Stage 3A Moderate CKD (GFR = 45-59 mL/min/1.73 square meters)    Stage 3B Moderate CKD (GFR = 30-44 mL/min/1.73 square meters)    Stage 4 Severe CKD (GFR = 15-29 mL/min/1.73 square meters)    Stage 5 End Stage CKD (GFR <15 mL/min/1.73 square meters)  Note: GFR calculation is accurate only with a steady state creatinine    C-reactive protein [[id number]]  (Abnormal) Collected: 05/16/24 1836    Lab Status: Final result Specimen: Blood from Arm, Left Updated: 05/16/24 1901     .9 mg/L     Lactic acid [[id number]]  (Normal) Collected: 05/16/24 1836    Lab Status: Final result Specimen: Blood from Arm, Left Updated: 05/16/24 1900     LACTIC ACID 1.5 mmol/L     Narrative:      Result may be elevated if tourniquet was used during collection.    Blood culture #1 [[id number]] Collected: 05/16/24 1836    Lab Status: In process Specimen: Blood from Hand, Left Updated: 05/16/24 1849    Blood culture #2 [[id number]] Collected: 05/16/24 1836    Lab Status: In process Specimen: Blood from Arm, Left Updated: 05/16/24 1849    CBC and differential [[id number]] Collected: 05/16/24 1836    Lab Status: Final result Specimen: Blood from Arm, Left Updated: 05/16/24 1847     WBC 7.40 Thousand/uL      RBC 4.01 Million/uL      Hemoglobin 11.8 g/dL      Hematocrit 35.7 %      MCV 89 fL      MCH 29.4 pg      MCHC 33.1 g/dL      RDW 11.9 %      MPV 10.4 fL      Platelets 197 Thousands/uL      nRBC 0 /100 WBCs      Segmented % 66 %      Immature Grans % 0 %      Lymphocytes % 25 %      Monocytes % 8 %      Eosinophils Relative 1 %      Basophils Relative 0 %      Absolute Neutrophils 4.81 Thousands/µL      Absolute Immature Grans 0.03 Thousand/uL      Absolute Lymphocytes 1.85 Thousands/µL      Absolute Monocytes 0.61 Thousand/µL      Eosinophils Absolute 0.07 Thousand/µL      Basophils Absolute 0.03 Thousands/µL                     XR foot 3+ views RIGHT   ED Interpretation by PATI Mcdonald (05/16 1915)   No acute bony abnormality identified by me.                 Procedures  Procedures         ED Course  ED Course as of 05/16/24 2004   Thu May 16, 2024   1922 Patient resting comfortably after pain medication.  Has no further questions or complaints at this time.  I reviewed the results with her and her daughter at bedside and given acute progression of erythema, warmth, edema of the right foot with ongoing purulent drainage, will admit to [facility] for IV antibiotics and podiatry consult   1922 SLIM messaged regarding pt case                               SBIRT 20yo+      Flowsheet Row Most Recent Value   Initial Alcohol Screen: US AUDIT-C     1. How often do you have a drink containing alcohol? 0 Filed at: 05/16/2024 1636   2. How many drinks containing alcohol do you have on a typical day you are drinking?  0 Filed at: 05/16/2024 1636   3a. Male UNDER 65: How often do you have five or more drinks on one occasion? 0 Filed at: 05/16/2024 1636   3b. FEMALE Any Age, or MALE 65+: How often do you have 4 or more drinks on one occassion? 0 Filed at: 05/16/2024 1636   Audit-C Score 0 Filed at: 05/16/2024 1636   TAMRA: How many times in the past year have you...    Used an illegal drug or used a prescription medication for non-medical reasons? Never Filed at: 05/16/2024 1636                      Medical Decision Making  DDX including but not limited to: Diabetic foot ulcer, cellulitis; considered but less likely osteomyelitis; consider but doubt necrotizing fasciitis    Patient with erythematous, warm, edematous right foot with active drainage from the webspace between the first and second toes.  Exquisitely tender to the touch, however no crepitus.  X-ray without free gas or evidence of osteomyelitis.  Labs reviewed and with notably elevated inflammatory markers.  No leukocytosis, normal lactic acid, normal procalcitonin.  The patient has  been on antibiotics for 1.5 days.  Given the rapid regression of infectious symptoms with patient being a diabetic with hyperglycemia, discussed with Vic.  The patient requests admission at this time due to pain and is now agreeable to staying in the hospital.    Problems Addressed:  Cellulitis and abscess of toe of right foot: acute illness or injury    Amount and/or Complexity of Data Reviewed  Labs: ordered.  Radiology: ordered and independent interpretation performed.    Risk  OTC drugs.  Prescription drug management.  Parenteral controlled substances.  Decision regarding hospitalization.             Disposition  Final diagnoses:   Cellulitis and abscess of toe of right foot     Time reflects when diagnosis was documented in both MDM as applicable and the Disposition within this note       Time User Action Codes Description Comment    5/16/2024  7:46 PM Rosa Maria De Paz Add [L03.031,  L02.611] Cellulitis and abscess of toe of right foot           ED Disposition       ED Disposition   Admit    Condition   Stable    Date/Time   Thu May 16, 2024 1946    Comment   Case was discussed with NAVEEN Gallego and the patient's admission status was agreed to be Admission Status: inpatient status to the service of Dr. Funez .               Follow-up Information    None         Patient's Medications   Discharge Prescriptions    No medications on file       No discharge procedures on file.    PDMP Review       None            ED Provider  Electronically Signed by             PATI Mcdonald  05/1945       PATI Mcdonald  05/16/24 2004

## 2024-05-17 LAB
ANION GAP SERPL CALCULATED.3IONS-SCNC: 8 MMOL/L (ref 4–13)
APTT PPP: 52 SECONDS (ref 23–37)
BUN SERPL-MCNC: 14 MG/DL (ref 5–25)
CALCIUM SERPL-MCNC: 8.7 MG/DL (ref 8.4–10.2)
CHLORIDE SERPL-SCNC: 98 MMOL/L (ref 96–108)
CO2 SERPL-SCNC: 29 MMOL/L (ref 21–32)
CREAT SERPL-MCNC: 0.83 MG/DL (ref 0.6–1.3)
ERYTHROCYTE [DISTWIDTH] IN BLOOD BY AUTOMATED COUNT: 12 % (ref 11.6–15.1)
EST. AVERAGE GLUCOSE BLD GHB EST-MCNC: 341 MG/DL
GFR SERPL CREATININE-BSD FRML MDRD: 68 ML/MIN/1.73SQ M
GLUCOSE SERPL-MCNC: 187 MG/DL (ref 65–140)
GLUCOSE SERPL-MCNC: 188 MG/DL (ref 65–140)
GLUCOSE SERPL-MCNC: 208 MG/DL (ref 65–140)
GLUCOSE SERPL-MCNC: 263 MG/DL (ref 65–140)
GLUCOSE SERPL-MCNC: 379 MG/DL (ref 65–140)
HBA1C MFR BLD: 13.5 %
HCT VFR BLD AUTO: 34.6 % (ref 34.8–46.1)
HGB BLD-MCNC: 11.8 G/DL (ref 11.5–15.4)
MAGNESIUM SERPL-MCNC: 1.9 MG/DL (ref 1.9–2.7)
MCH RBC QN AUTO: 30.2 PG (ref 26.8–34.3)
MCHC RBC AUTO-ENTMCNC: 34.1 G/DL (ref 31.4–37.4)
MCV RBC AUTO: 89 FL (ref 82–98)
PHOSPHATE SERPL-MCNC: 3.4 MG/DL (ref 2.3–4.1)
PLATELET # BLD AUTO: 175 THOUSANDS/UL (ref 149–390)
PMV BLD AUTO: 9.8 FL (ref 8.9–12.7)
POTASSIUM SERPL-SCNC: 3.7 MMOL/L (ref 3.5–5.3)
RBC # BLD AUTO: 3.91 MILLION/UL (ref 3.81–5.12)
SODIUM SERPL-SCNC: 135 MMOL/L (ref 135–147)
WBC # BLD AUTO: 5.5 THOUSAND/UL (ref 4.31–10.16)

## 2024-05-17 PROCEDURE — 85027 COMPLETE CBC AUTOMATED: CPT | Performed by: PHYSICIAN ASSISTANT

## 2024-05-17 PROCEDURE — 10061 I&D ABSCESS COMP/MULTIPLE: CPT | Performed by: PODIATRIST

## 2024-05-17 PROCEDURE — 83735 ASSAY OF MAGNESIUM: CPT | Performed by: PHYSICIAN ASSISTANT

## 2024-05-17 PROCEDURE — 82948 REAGENT STRIP/BLOOD GLUCOSE: CPT

## 2024-05-17 PROCEDURE — 99232 SBSQ HOSP IP/OBS MODERATE 35: CPT | Performed by: INTERNAL MEDICINE

## 2024-05-17 PROCEDURE — 84100 ASSAY OF PHOSPHORUS: CPT | Performed by: PHYSICIAN ASSISTANT

## 2024-05-17 PROCEDURE — 99222 1ST HOSP IP/OBS MODERATE 55: CPT | Performed by: PODIATRIST

## 2024-05-17 PROCEDURE — 85730 THROMBOPLASTIN TIME PARTIAL: CPT | Performed by: INTERNAL MEDICINE

## 2024-05-17 PROCEDURE — 36415 COLL VENOUS BLD VENIPUNCTURE: CPT | Performed by: INTERNAL MEDICINE

## 2024-05-17 PROCEDURE — 80048 BASIC METABOLIC PNL TOTAL CA: CPT | Performed by: PHYSICIAN ASSISTANT

## 2024-05-17 RX ORDER — LIDOCAINE HYDROCHLORIDE 20 MG/ML
5 INJECTION, SOLUTION EPIDURAL; INFILTRATION; INTRACAUDAL; PERINEURAL ONCE
Status: COMPLETED | OUTPATIENT
Start: 2024-05-17 | End: 2024-05-17

## 2024-05-17 RX ADMIN — Medication 1000 UNITS: at 08:54

## 2024-05-17 RX ADMIN — INSULIN LISPRO 2 UNITS: 100 INJECTION, SOLUTION INTRAVENOUS; SUBCUTANEOUS at 17:32

## 2024-05-17 RX ADMIN — INSULIN LISPRO 3 UNITS: 100 INJECTION, SOLUTION INTRAVENOUS; SUBCUTANEOUS at 22:33

## 2024-05-17 RX ADMIN — HEPARIN SODIUM 2000 UNITS: 1000 INJECTION INTRAVENOUS; SUBCUTANEOUS at 03:43

## 2024-05-17 RX ADMIN — HYDROMORPHONE HYDROCHLORIDE 0.2 MG: 0.2 INJECTION, SOLUTION INTRAMUSCULAR; INTRAVENOUS; SUBCUTANEOUS at 16:16

## 2024-05-17 RX ADMIN — INSULIN LISPRO 6 UNITS: 100 INJECTION, SOLUTION INTRAVENOUS; SUBCUTANEOUS at 00:13

## 2024-05-17 RX ADMIN — ATENOLOL 25 MG: 50 TABLET ORAL at 08:54

## 2024-05-17 RX ADMIN — VANCOMYCIN HYDROCHLORIDE 1250 MG: 5 INJECTION, POWDER, LYOPHILIZED, FOR SOLUTION INTRAVENOUS at 08:45

## 2024-05-17 RX ADMIN — PANTOPRAZOLE SODIUM 40 MG: 40 TABLET, DELAYED RELEASE ORAL at 08:54

## 2024-05-17 RX ADMIN — OXYCODONE HYDROCHLORIDE 5 MG: 5 TABLET ORAL at 22:29

## 2024-05-17 RX ADMIN — INSULIN LISPRO 1 UNITS: 100 INJECTION, SOLUTION INTRAVENOUS; SUBCUTANEOUS at 06:12

## 2024-05-17 RX ADMIN — GABAPENTIN 100 MG: 100 CAPSULE ORAL at 08:54

## 2024-05-17 RX ADMIN — LEVOTHYROXINE SODIUM 100 MCG: 25 TABLET ORAL at 06:10

## 2024-05-17 RX ADMIN — CEFEPIME HYDROCHLORIDE 2000 MG: 2 INJECTION, SOLUTION INTRAVENOUS at 07:56

## 2024-05-17 RX ADMIN — SERTRALINE HYDROCHLORIDE 25 MG: 50 TABLET ORAL at 08:54

## 2024-05-17 RX ADMIN — AMIODARONE HYDROCHLORIDE 200 MG: 200 TABLET ORAL at 08:54

## 2024-05-17 RX ADMIN — Medication 2.5 MG: at 14:29

## 2024-05-17 RX ADMIN — LIDOCAINE HYDROCHLORIDE 5 ML: 20 INJECTION, SOLUTION EPIDURAL; INFILTRATION; INTRACAUDAL; PERINEURAL at 13:44

## 2024-05-17 NOTE — ASSESSMENT & PLAN NOTE
Follows with [facility] cardiology  Examines euvolemic on admit  Maintained on Lasix 20 Mg daily, hold until surgical decision made by podiatry

## 2024-05-17 NOTE — H&P
"Critical access hospital  H&P  Name: Jackie Cunningham 78 y.o. female I MRN: [id number]  Unit/Bed#: ED 05 I Date of Admission: 5/16/2024   Date of Service: 5/16/2024 I Hospital Day: 0      Assessment & Plan   * Cellulitis and abscess of foot  Assessment & Plan  Developed mild pain on 5/13 with development of erythema and swelling on 5/15  Was seen in the ED on 5/15 and offered admission, however patient refused so abscess was drained and she was placed on clindamycin, return 5/16 due to worsened pain  CT lower extremity 5/15: \"Shallow soft tissue ulceration between the first and second digits with 1.0 cm rim-enhancing collection in the first webspace. \"  Wound culture obtained 5/15 in the ED, growing gram-positive cocci in pairs and chains   ESR 69, .9  Hold on MRI at this time - pt has implanted loop recorder   Continue vancomycin and cefepime  Podiatry consulted  N.p.o. at midnight in case operative intervention required    PAF (paroxysmal atrial fibrillation) (HCC)  Assessment & Plan  Status post ablation 12/2023  Home regimen: Amiodarone 200 Mg daily, atenolol 25 Mg daily, and anticoagulation with Eliquis 5 Mg twice daily  Continue home amiodarone and atenolol-hold Eliquis and placed on heparin gtt. in case surgical intervention needed  Rate controlled and in normal sinus rhythm on admit    Type 2 diabetes mellitus (HCC)  Assessment & Plan  No results found for: \"HGBA1C\"    Recent Labs     05/15/24  1931   POCGLU 304*       Blood Sugar Average: Last 72 hrs:  Home regimen: Ozempic and SSI at home  Placed on SSI every 6 hours while inpatient -goal blood sugar 140-180 in setting of acute infection  Held on initiating Lantus on admit due to n.p.o. status awaiting podiatry decision for surgical intervention  Check hemoglobin A1c      Primary hypertension  Assessment & Plan  Home regimen: Atenolol 25 Mg daily, Lasix 20 Mg daily, losartan 100 Mg daily  Continue home atenolol  Hold Lasix and losartan " "in setting of possible surgical intervention for right foot infection-resume if deemed nonoperative    Hypertrophic cardiomyopathy (HCC)  Assessment & Plan  Follows with [facility] cardiology  Examines euvolemic on admit  Maintained on Lasix 20 Mg daily, hold until surgical decision made by podiatry    Depression  Assessment & Plan  Continue home Zoloft 25 Mg daily           VTE Pharmacologic Prophylaxis: VTE Score: 5 High Risk (Score >/= 5) - Pharmacological DVT Prophylaxis Ordered: heparin drip. Sequential Compression Devices Ordered.  Code Status: Level 1 - Full Code   Discussion with family: Patient declined call to .     Anticipated Length of Stay: Patient will be admitted on an inpatient basis with an anticipated length of stay of greater than 2 midnights secondary to right foot cellulitis with abscess.    Chief Complaint: \" Pain significantly worsened this morning\"    History of Present Illness:  Jackie Cunningham is a 78 y.o. female with a PMH of IDDM 2, HTN, PAF s/p ablation on Eliquis, and HCM who presents with worsening right foot pain since this morning at 0900.  Patient reports she first noticed mild pain on Monday of her right foot right between her great toe and second toe.  She then developed erythema and swelling on Wednesday.  She was seen in the ER and recommended admission, however she did not want to stay and was discharged on oral antibiotics after I&D performed.  When pain worsened again this morning she came back to the hospital and is amicable to admission at this time.  Denies fevers, chills, chest pain, dyspnea, abdominal pain, NVD, or urinary symptoms.  Reports right foot pain is controlled after receiving IV Dilaudid x 1 in the ED.    Review of Systems:  Review of Systems   Constitutional:  Negative for chills and fever.   HENT:  Negative for congestion.    Respiratory:  Negative for cough and shortness of breath.    Cardiovascular:  Negative for chest pain.   Gastrointestinal:  " Negative for abdominal pain, constipation, diarrhea, nausea and vomiting.   Genitourinary:  Negative for difficulty urinating and dysuria.   Musculoskeletal:  Positive for gait problem.        Positive for right foot pain.   Skin:  Positive for wound.   Neurological:  Negative for weakness and numbness.   All other systems reviewed and are negative.      Past Medical and Surgical History:   Past Medical History:   Diagnosis Date    Atrial fibrillation (HCC)     Diabetes mellitus (HCC)     DM 2    GERD (gastroesophageal reflux disease)     Hypertension        Past Surgical History:   Procedure Laterality Date    ABLATION OF DYSRHYTHMIC FOCUS      APPENDECTOMY      CHOLECYSTECTOMY      COLONOSCOPY      UPPER GASTROINTESTINAL ENDOSCOPY         Meds/Allergies:  Prior to Admission medications    Medication Sig Start Date End Date Taking? Authorizing Provider   amiodarone 200 mg tablet Take 200 mg by mouth daily   Yes Historical Provider, MD   atenolol (TENORMIN) 25 mg tablet Take 25 mg by mouth daily   Yes Historical Provider, MD   Cholecalciferol 25 MCG (1000 UT) capsule Take 1,000 Units by mouth every 24 hours   Yes Historical Provider, MD   clindamycin (CLEOCIN) 300 MG capsule Take 1 capsule (300 mg total) by mouth every 6 (six) hours for 7 days 5/15/24 5/22/24 Yes Cailin Hunt MD   famotidine (PEPCID) 40 MG tablet Take 40 mg by mouth every other day   Yes Historical Provider, MD   furosemide (LASIX) 20 mg tablet TAKE ONE TABLET BY MOUTH EVERY DAY for 90   Yes Historical Provider, MD   gabapentin (NEURONTIN) 100 mg capsule Take 1 capsule by mouth daily   Yes Historical Provider, MD   levothyroxine 25 mcg tablet Take 25 mcg by mouth daily   Yes Historical Provider, MD   losartan (COZAAR) 100 MG tablet Take 100 mg by mouth daily   Yes Historical Provider, MD   pantoprazole (PROTONIX) 40 mg tablet Take 40 mg by mouth daily   Yes Historical Provider, MD   sertraline (ZOLOFT) 25 mg tablet Take 25 mg by mouth  daily   Yes Historical Provider, MD   apixaban (Eliquis) 5 mg take 1 tablet by mouth twice a day 30  5/16/24 Yes Historical Provider, MD   Meclizine HCl 25 MG CHEW 1 tablet Orally Three times a day as needed for 30 days    Historical Provider, MD   nitroglycerin (NITROSTAT) 0.4 mg SL tablet 1 tab under tongue every 5 mins as directed for chest pain Sublingual    Historical Provider, MD   Ozempic, 1 MG/DOSE, 4 MG/3ML injection pen  2/26/24   Historical Provider, MD   polyethylene glycol (GLYCOLAX) 17 GM/SCOOP powder Take 17 g by mouth daily 2/28/24   Elías Mcmillan, DO   semaglutide, 0.25 or 0.5 mg/dose, (Ozempic, 0.25 or 0.5 MG/DOSE,) 2 mg/1.5 mL injection pen Inject under the skin once a week    Historical Provider, MD   Jardiance 25 MG TABS Take 25 mg by mouth daily  5/16/24  Historical Provider, MD     I have reviewed home medications with patient personally.    Allergies:   Allergies   Allergen Reactions    Cephalexin Vomiting and Itching    Codeine Vomiting and Hives    Morphine GI Intolerance    Amoxicillin Dizziness and Rash     Other Reaction(s): HANDS ITCHING       Social History:  Marital Status: Unknown   Occupation: Retired  Patient Pre-hospital Living Situation: Home, Alone has aides come daily  Patient Pre-hospital Level of Mobility: walks, currently using walker since right foot pain started previously walked independently  Patient Pre-hospital Diet Restrictions: Carb conscious  Substance Use History:   Social History     Substance and Sexual Activity   Alcohol Use Never     Social History     Tobacco Use   Smoking Status Former    Types: Cigarettes   Smokeless Tobacco Never     Social History     Substance and Sexual Activity   Drug Use Not on file       Family History:  Family History   Problem Relation Age of Onset    Colon cancer Neg Hx     Colon polyps Neg Hx        Physical Exam:     Vitals:   Blood Pressure: 127/65 (05/16/24 2015)  Pulse: 77 (05/16/24 2015)  Temperature: 97.5 °F  (36.4 °C) (05/16/24 1635)  Temp Source: Temporal (05/16/24 1635)  Respirations: 16 (05/16/24 2015)  Weight - Scale: 79.8 kg (176 lb) (05/16/24 2019)  SpO2: 95 % (05/16/24 2015)    Physical Exam  Vitals and nursing note reviewed.   Constitutional:       General: She is not in acute distress.     Appearance: Normal appearance. She is not ill-appearing.      Comments: Pleasant and conversational.   HENT:      Head: Normocephalic.      Nose: Nose normal.      Mouth/Throat:      Mouth: Mucous membranes are moist.   Eyes:      Extraocular Movements: Extraocular movements intact.      Conjunctiva/sclera: Conjunctivae normal.   Cardiovascular:      Rate and Rhythm: Normal rate and regular rhythm.      Pulses: Normal pulses.   Pulmonary:      Effort: Pulmonary effort is normal. No respiratory distress.      Breath sounds: Normal breath sounds. No wheezing or rhonchi.   Abdominal:      General: Abdomen is flat. Bowel sounds are normal.      Palpations: Abdomen is soft.      Tenderness: There is no abdominal tenderness. There is no guarding or rebound.   Musculoskeletal:         General: Normal range of motion.      Cervical back: Normal range of motion.      Comments: Erythema and swelling noted to right first and second toes.  Lymphangitic streaking of the dorsal aspect of the right foot.   Skin:     General: Skin is warm and dry.   Neurological:      General: No focal deficit present.      Mental Status: She is alert and oriented to person, place, and time.   Psychiatric:         Mood and Affect: Mood normal.         Thought Content: Thought content normal.          Additional Data:     Lab Results:  Results from last 7 days   Lab Units 05/16/24  1836   WBC Thousand/uL 7.40   HEMOGLOBIN g/dL 11.8   HEMATOCRIT % 35.7   PLATELETS Thousands/uL 197   SEGS PCT % 66   LYMPHO PCT % 25   MONO PCT % 8   EOS PCT % 1     Results from last 7 days   Lab Units 05/16/24  1836 05/15/24  1627   SODIUM mmol/L 132* 131*   POTASSIUM mmol/L 3.8  4.0   CHLORIDE mmol/L 96 92*   CO2 mmol/L 30 30   BUN mg/dL 10 13   CREATININE mg/dL 0.90 1.00   ANION GAP mmol/L 6 9   CALCIUM mg/dL 9.0 9.2   ALBUMIN g/dL  --  4.0   TOTAL BILIRUBIN mg/dL  --  1.62*   ALK PHOS U/L  --  97   ALT U/L  --  14   AST U/L  --  12*   GLUCOSE RANDOM mg/dL 289* 454*     Results from last 7 days   Lab Units 05/16/24  1836   INR  1.10     Results from last 7 days   Lab Units 05/15/24  1931   POC GLUCOSE mg/dl 304*         Results from last 7 days   Lab Units 05/16/24  1836 05/15/24  1627   LACTIC ACID mmol/L 1.5 2.0   PROCALCITONIN ng/ml <0.05 0.06       Lines/Drains:  Invasive Devices       Peripheral Intravenous Line  Duration             Peripheral IV 05/16/24 Right;Ventral (anterior) Forearm <1 day                        Imaging: Personally reviewed the following imaging: xray(s) right foot x-ray without obvious signs of cortical disruption, personally reviewed radiology impression from CT scan of RLE on 5/15  XR foot 3+ views RIGHT   ED Interpretation by PATI Mcdonald (05/16 1915)   No acute bony abnormality identified by me.          EKG and Other Studies Reviewed on Admission:   EKG:  Personally reviewed-NSR without acute abnormality.  Normal QTc.    ** Please Note: This note has been constructed using a voice recognition system. **

## 2024-05-17 NOTE — ASSESSMENT & PLAN NOTE
Lab Results   Component Value Date    HGBA1C 13.5 (H) 05/16/2024       Recent Labs     05/15/24  1931 05/16/24  2112 05/17/24  0006 05/17/24  0608   POCGLU 304* 286* 379* 187*       Blood Sugar Average: Last 72 hrs:  (P) 284Home regimen: Ozempic and SSI at home  Placed on SSI every 6 hours while inpatient -goal blood sugar 140-180 in setting of acute infection  Held on initiating Lantus on admit due to n.p.o. status awaiting podiatry decision for surgical intervention

## 2024-05-17 NOTE — PROGRESS NOTES
[facility]  Progress Note  Name: Jackie Cunningham I  MRN: [id number]  Unit/Bed#: ED 05 I Date of Admission: 5/16/2024   Date of Service: 5/17/2024 I Hospital Day: 1    Assessment & Plan   Hypertrophic cardiomyopathy (HCC)  Assessment & Plan  Follows with [facility] cardiology  Examines euvolemic on admit  Maintained on Lasix 20 Mg daily, hold until surgical decision made by podiatry    Type 2 diabetes mellitus ([facility])  Assessment & Plan  Lab Results   Component Value Date    HGBA1C 13.5 (H) 05/16/2024       Recent Labs     05/15/24  1931 05/16/24  2112 05/17/24  0006 05/17/24  0608   POCGLU 304* 286* 379* 187*       Blood Sugar Average: Last 72 hrs:  (P) 284Home regimen: Ozempic and SSI at home  Placed on SSI every 6 hours while inpatient -goal blood sugar 140-180 in setting of acute infection  Held on initiating Lantus on admit due to n.p.o. status awaiting podiatry decision for surgical intervention        PAF (paroxysmal atrial fibrillation) ([facility])  Assessment & Plan  Status post ablation 12/2023  Home regimen: Amiodarone 200 Mg daily, atenolol 25 Mg daily, and anticoagulation with Eliquis 5 Mg twice daily  Continue home amiodarone and atenolol-hold Eliquis   Rate controlled and in normal sinus rhythm on admit    Depression  Assessment & Plan  Continue home Zoloft 25 Mg daily    Primary hypertension  Assessment & Plan  Home regimen: Atenolol 25 Mg daily, Lasix 20 Mg daily, losartan 100 Mg daily  Continue home atenolol  Hold Lasix and losartan in setting of possible surgical intervention for right foot infection-resume if deemed nonoperative  /68   Pulse 77   Temp 97.5 °F (36.4 °C) (Temporal)   Resp 16   Wt 79.3 kg (174 lb 14.4 oz)   SpO2 94%   BMI 31.99 kg/m²       * Cellulitis and abscess of foot  Assessment & Plan  Developed mild pain on 5/13 with development of erythema and swelling on 5/15  Was seen in the ED on 5/15 and offered admission, however patient refused so abscess was  "drained and she was placed on clindamycin, return  due to worsened pain  CT lower extremity 5/15: \"Shallow soft tissue ulceration between the first and second digits with 1.0 cm rim-enhancing collection in the first webspace. \"  Wound culture obtained 5/15 in the ED, growing gram-positive cocci in pairs and chains   ESR 69, .9  Hold on MRI at this time - pt has implanted loop recorder   Continue vancomycin and cefepime  Podiatry consulted  N.p.o. at midnight in case operative intervention required               VTE Pharmacologic Prophylaxis: VTE Score: 5 Moderate Risk (Score 3-4) - Pharmacological DVT Prophylaxis Contraindicated. Sequential Compression Devices Ordered.    Mobility:      -[facility] Goal achieved. Continue to encourage appropriate mobility.    Patient Centered Rounds: I performed bedside rounds with nursing staff today.   Discussions with Specialists or Other Care Team Provider: yes    Education and Discussions with Family / Patient:  yes.     Total Time Spent on Date of Encounter in care of patient: 39 mins. This time was spent on one or more of the following: performing physical exam; counseling and coordination of care; obtaining or reviewing history; documenting in the medical record; reviewing/ordering tests, medications or procedures; communicating with other healthcare professionals and discussing with patient's family/caregivers.    Current Length of Stay: 1 day(s)  Current Patient Status: Inpatient   Certification Statement: The patient will continue to require additional inpatient hospital stay due to pending eval and stabilization  Discharge Plan:  to be determined     Code Status: Level 1 - Full Code    Subjective:   Seen and examined at bedside  Awake, alert and oriented  Still grieving for recent 's loss  No other complaints      Objective:     Vitals:   Temp (24hrs), Av.5 °F (36.4 °C), Min:97.5 °F (36.4 °C), Max:97.5 °F (36.4 °C)    Temp:  [97.5 °F (36.4 °C)] 97.5 °F " (36.4 °C)  HR:  [74-88] 77  Resp:  [14-18] 16  BP: (125-187)/(63-85) 160/68  SpO2:  [90 %-99 %] 94 %  Body mass index is 31.99 kg/m².     Input and Output Summary (last 24 hours):     Intake/Output Summary (Last 24 hours) at 5/17/2024 1212  Last data filed at 5/17/2024 0314  Gross per 24 hour   Intake 536 ml   Output 400 ml   Net 136 ml       Physical Exam:   Physical Exam  Vitals reviewed.   Constitutional:       Appearance: She is obese.   HENT:      Head: Atraumatic.      Mouth/Throat:      Mouth: Mucous membranes are dry.   Eyes:      General: No scleral icterus.  Cardiovascular:      Rate and Rhythm: Normal rate.      Heart sounds: Normal heart sounds.   Pulmonary:      Effort: No respiratory distress.   Abdominal:      General: Bowel sounds are normal.   Musculoskeletal:      Right lower leg: No edema.      Left lower leg: No edema.   Skin:     Findings: Erythema and lesion present.   Neurological:      Mental Status: She is alert and oriented to person, place, and time.   Psychiatric:         Mood and Affect: Mood normal.          Additional Data:     Labs:  Results from last 7 days   Lab Units 05/17/24  0617 05/16/24  1836   WBC Thousand/uL 5.50 7.40   HEMOGLOBIN g/dL 11.8 11.8   HEMATOCRIT % 34.6* 35.7   PLATELETS Thousands/uL 175 197   SEGS PCT %  --  66   LYMPHO PCT %  --  25   MONO PCT %  --  8   EOS PCT %  --  1     Results from last 7 days   Lab Units 05/17/24  0617 05/16/24  1836 05/15/24  1627   SODIUM mmol/L 135   < > 131*   POTASSIUM mmol/L 3.7   < > 4.0   CHLORIDE mmol/L 98   < > 92*   CO2 mmol/L 29   < > 30   BUN mg/dL 14   < > 13   CREATININE mg/dL 0.83   < > 1.00   ANION GAP mmol/L 8   < > 9   CALCIUM mg/dL 8.7   < > 9.2   ALBUMIN g/dL  --   --  4.0   TOTAL BILIRUBIN mg/dL  --   --  1.62*   ALK PHOS U/L  --   --  97   ALT U/L  --   --  14   AST U/L  --   --  12*   GLUCOSE RANDOM mg/dL 188*   < > 454*    < > = values in this interval not displayed.     Results from last 7 days   Lab Units  05/16/24  1836   INR  1.10     Results from last 7 days   Lab Units 05/17/24  0608 05/17/24  0006 05/16/24  2112 05/15/24  1931   POC GLUCOSE mg/dl 187* 379* 286* 304*     Results from last 7 days   Lab Units 05/16/24  1836   HEMOGLOBIN A1C % 13.5*     Results from last 7 days   Lab Units 05/16/24  1836 05/15/24  1627   LACTIC ACID mmol/L 1.5 2.0   PROCALCITONIN ng/ml <0.05 0.06       Lines/Drains:  Invasive Devices       Peripheral Intravenous Line  Duration             Peripheral IV 05/16/24 Distal;Left;Ventral (anterior) Wrist <1 day    Peripheral IV 05/16/24 Right;Ventral (anterior) Forearm <1 day                          Imaging: Reviewed radiology reports from this admission including: xray(s)    Recent Cultures (last 7 days):   Results from last 7 days   Lab Units 05/16/24  1836 05/15/24  2026 05/15/24  1634 05/15/24  1627   BLOOD CULTURE  Received in Microbiology Lab. Culture in Progress.  Received in Microbiology Lab. Culture in Progress.  --  No Growth at 24 hrs. No Growth at 24 hrs.   GRAM STAIN RESULT   --  3+ Gram positive cocci in pairs and chains*  Rare Polys*  --   --    WOUND CULTURE   --  4+ Growth of Streptococcus*  --   --        Last 24 Hours Medication List:   Current Facility-Administered Medications   Medication Dose Route Frequency Provider Last Rate    acetaminophen  650 mg Oral Q6H PRN Elva Gallego PA-C      aluminum-magnesium hydroxide-simethicone  30 mL Oral Q6H PRN Elva Gallego PA-C      amiodarone  200 mg Oral Daily Elva Gallego PA-C      atenolol  25 mg Oral Daily Elva Gallego PA-C      cefepime  2,000 mg Intravenous Q12H Elva Gallego PA-C 2,000 mg (05/17/24 0756)    cholecalciferol  1,000 Units Oral Daily Elva Gallego PA-C      [START ON 5/18/2024] famotidine  40 mg Oral Every Other Day Elva Gallego PA-C      gabapentin  100 mg Oral Daily Elva Gallego PA-C      HYDROmorphone  0.2 mg Intravenous Q6H PRN Elva Gallego PA-C       insulin lispro  1-6 Units Subcutaneous Q6H [facility] Elva Gallego PA-C      levothyroxine  100 mcg Oral Early Morning Elva Gallego PA-C      ondansetron  4 mg Intravenous Q6H PRN Elva Gallego PA-C      oxyCODONE  2.5 mg Oral Q6H PRN Elva Gallego PA-C      Or    oxyCODONE  5 mg Oral Q6H PRN Elva Gallego PA-C      pantoprazole  40 mg Oral Daily Elva Gallego PA-C      senna  1 tablet Oral HS PRN Elva Gallego PA-C      sertraline  25 mg Oral Daily Elva Gallego PA-C      vancomycin  15 mg/kg Intravenous Q24H Elva Gallego PA-C 1,250 mg (05/17/24 1435)        Today, Patient Was Seen By: Maryellen Funez MD    **Please Note: This note may have been constructed using a voice recognition system.**

## 2024-05-17 NOTE — ASSESSMENT & PLAN NOTE
Status post ablation 12/2023  Home regimen: Amiodarone 200 Mg daily, atenolol 25 Mg daily, and anticoagulation with Eliquis 5 Mg twice daily  Continue home amiodarone and atenolol-hold Eliquis   Rate controlled and in normal sinus rhythm on admit

## 2024-05-17 NOTE — ASSESSMENT & PLAN NOTE
Home regimen: Atenolol 25 Mg daily, Lasix 20 Mg daily, losartan 100 Mg daily  Continue home atenolol  Hold Lasix and losartan in setting of possible surgical intervention for right foot infection-resume if deemed nonoperative  /68   Pulse 77   Temp 97.5 °F (36.4 °C) (Temporal)   Resp 16   Wt 79.3 kg (174 lb 14.4 oz)   SpO2 94%   BMI 31.99 kg/m²

## 2024-05-17 NOTE — PROGRESS NOTES
Jackie Cunningham is a 78 y.o. female who is currently ordered Vancomycin IV with management by the Pharmacy Consult service.  Relevant clinical data and objective / subjective history reviewed.  Vancomycin Assessment:  Indication and Goal AUC/Trough: Soft tissue (goal -600, trough >10), -600, trough >10  Clinical Status: stable  Micro:     Renal Function:  SCr: 0.83 mg/dL  CrCl: 56.3 mL/min  Renal replacement: Not on dialysis  Days of Therapy: 2  Current Dose: 1250mg every 24 hours  Vancomycin Plan:  New Dosing: No change  Estimated AUC: 460 mcg*hr/mL  Estimated Trough: 11.8 mcg/mL  Next Level: 5/18/24 at 0600  Renal Function Monitoring: Daily BMP and UOP  Pharmacy will continue to follow closely for s/sx of nephrotoxicity, infusion reactions and appropriateness of therapy.  BMP and CBC will be ordered per protocol. We will continue to follow the patient’s culture results and clinical progress daily. Also, cefepime will be adjusted if necessary.    Trip Torre, Pharmacist, PharmD, BCPS

## 2024-05-17 NOTE — ASSESSMENT & PLAN NOTE
Status post ablation 12/2023  Home regimen: Amiodarone 200 Mg daily, atenolol 25 Mg daily, and anticoagulation with Eliquis 5 Mg twice daily  Continue home amiodarone and atenolol-hold Eliquis and placed on heparin gtt. in case surgical intervention needed  Rate controlled and in normal sinus rhythm on admit

## 2024-05-17 NOTE — ASSESSMENT & PLAN NOTE
"No results found for: \"HGBA1C\"    Recent Labs     05/15/24  1931   POCGLU 304*       Blood Sugar Average: Last 72 hrs:  Home regimen: Ozempic and SSI at home  Placed on SSI every 6 hours while inpatient -goal blood sugar 140-180 in setting of acute infection  Held on initiating Lantus on admit due to n.p.o. status awaiting podiatry decision for surgical intervention  Check hemoglobin A1c    "

## 2024-05-17 NOTE — PROGRESS NOTES
Jackie Cunningham is a 78 y.o. female who is currently ordered Vancomycin IV with management by the Pharmacy Consult service.  Relevant clinical data and objective / subjective history reviewed.  Vancomycin Assessment:  Indication and Goal AUC/Trough: Soft tissue (goal -600, trough >10)  Clinical Status:  initial dosing  Micro:     Renal Function:  SCr: 0.9 mg/dL  CrCl: 51.9 mL/min  Renal replacement: Not on dialysis  Days of Therapy: 1  Current Dose: 1500 mg once   Vancomycin Plan:  New Dosin mg Q 24 H   Estimated AUC: 492 mcg*hr/mL  Estimated Trough: 13.1 mcg/mL  Next Level: 24 @ 0600 with AM labs  Renal Function Monitoring: Daily BMP and UOP  Pharmacy will continue to follow closely for s/sx of nephrotoxicity, infusion reactions and appropriateness of therapy.  BMP and CBC will be ordered per protocol. We will continue to follow the patient’s culture results and clinical progress daily.    Vivi Moss, Pharmacist

## 2024-05-17 NOTE — ASSESSMENT & PLAN NOTE
Home regimen: Atenolol 25 Mg daily, Lasix 20 Mg daily, losartan 100 Mg daily  Continue home atenolol  Hold Lasix and losartan in setting of possible surgical intervention for right foot infection-resume if deemed nonoperative

## 2024-05-17 NOTE — CONSULTS
Consult - Podiatry   Jackie Cunningham 78 y.o. female MRN: [id number]  Unit/Bed#: ED 05 Encounter: [id number]    Assessment & Plan     Abscess RFT 1st interspace (Deep)  Cellulitis RFT  Resistant nature of patient's infection most likely secondary to the deeper plantar nature of the abscess that was not appreciated with ER visit several days ago.  Incision and drainage abscess right foot abscess probed deep within the interspace plantarly approximately 1.5 cm, and produced 1 cc of purulence with procedure.  Continue with current IV antibiotics  Wound irrigated with 50 cc NSS  Packed with 1/4 inch Nu Gauze plain  Labs reviewed: CRP and ESR are notably elevated.  No leukocytosis with WBC 5.5, blood sugar 188, creatinine 0.83  Wound culture from 5/15/2024 grew 4+ growth Streptococcus anginosus  Dr. Tenorio will evaluate Saturday morning and decide on whether or not patient needs to go to the OR for further incision and drainage/debridement.  Probably best to remain n.p.o. 12 midnight until that point.    Hypertrophic cardiomyopathy, type 2 diabetes, A-fib, depression, hypertension, chronically anticoagulated on Eliquis, implanted loop recorder  Managed per internal medicine      Procedure:   I&D first interspace/complicated -   Cpt: 45349  5 cc of 2% Xylocaine plain infiltrated proximal first interspace to provide anesthesia  Incision made with 15 blade and  with small scissor.  1.5 cc purulent drainage expressed with incision.  Necrotic debris excised with 15 blade and forcep.  Wound irrigated with 50 cc of normal saline solution  Packed with 1/4 inch Nu Gauze followed by dry sterile dressing with 4 inch Abhishek and gauze.  Minimal pain/discomfort experience with procedure      History of Present Illness     HPI:  Jackie Cunningham is a 78 y.o. female who presents with red swollen foot localized to first interspace.  Patient first noticed redness on Monday and then it became more red and swollen by Wednesday.  Went to the  ED and declined hospital admission was started on p.o. antibiotics and had a superficial I&D performed.  Since then her symptoms have worsened and now presents to the ED again for treatment.  Significant past medical history of type 2 diabetes, hypertension, A-fib and chronically anticoagulated on Eliquis..    Podiatry consult requested to evaluate infection right foot.  Patient's chart reviewed noting all pertinent clinical laboratory data.    Consults  Review of Systems   Constitutional: Negative.    HENT: Negative.    Eyes: Negative.    Respiratory: Negative.    Cardiovascular: Denies any shortness of breath  Gastrointestinal: Negative.    Musculoskeletal: Pain right foot  Skin: Open wound between foot first and second toes  Neurological: Negative      Historical Information   Past Medical History:   Diagnosis Date    Atrial fibrillation (HCC)     Diabetes mellitus (HCC)     DM 2    GERD (gastroesophageal reflux disease)     Hypertension      Past Surgical History:   Procedure Laterality Date    ABLATION OF DYSRHYTHMIC FOCUS      APPENDECTOMY      CHOLECYSTECTOMY      COLONOSCOPY      UPPER GASTROINTESTINAL ENDOSCOPY       Social History   Social History     Substance and Sexual Activity   Alcohol Use Never     Social History     Substance and Sexual Activity   Drug Use Not on file     Social History     Tobacco Use   Smoking Status Former    Types: Cigarettes   Smokeless Tobacco Never     Family History:   Family History   Problem Relation Age of Onset    Colon cancer Neg Hx     Colon polyps Neg Hx        Meds/Allergies   Not in a hospital admission.  Allergies   Allergen Reactions    Cephalexin Vomiting and Itching    Codeine Vomiting and Hives    Morphine GI Intolerance    Amoxicillin Dizziness and Rash     Other Reaction(s): HANDS ITCHING       Objective   First Vitals:   Blood Pressure: 139/67 (05/16/24 1635)  Pulse: 88 (05/16/24 1635)  Temperature: 97.5 °F (36.4 °C) (05/16/24 1635)  Temp Source: Temporal  (05/16/24 1635)  Respirations: 18 (05/16/24 1635)  Weight - Scale: 79.8 kg (176 lb) (05/16/24 2019)  SpO2: 95 % (05/16/24 1635)    Current Vitals:   Blood Pressure: 160/68 (05/17/24 0630)  Pulse: 71 (05/17/24 1215)  Temperature: 97.5 °F (36.4 °C) (05/16/24 1635)  Temp Source: Temporal (05/16/24 1635)  Respirations: 16 (05/17/24 0630)  Weight - Scale: 79.3 kg (174 lb 14.4 oz) (05/16/24 2046)  SpO2: 94 % (05/17/24 1215)        /68   Pulse 71   Temp 97.5 °F (36.4 °C) (Temporal)   Resp 16   Wt 79.3 kg (174 lb 14.4 oz)   SpO2 94%   BMI 31.99 kg/m²     General Appearance:    Alert, cooperative, no distress, resting comfortably in ED bed #5 reporting pain improvement since antibiotics were started.   Head:    Normocephalic, without obvious abnormality, atraumatic   Eyes:    PERRL, conjunctiva/corneas clear, EOM's intact            Nose:   Moist mucous membranes, no drainage or sinus tenderness   Throat:   No tenderness, no exudates   Neck:   Supple, symmetrical, trachea midline, no JVD   Back:     Symmetric, no CVA tenderness   Lungs:     Respirations unlabored   Chest wall:    No tenderness or deformity   Heart:    Rate and rhythm noted on last vitals.    Abdomen:     Soft, non-tender, bowel sounds active all four quadrants,     no masses, no organomegaly       Lower Ext/Ortho: Pain/swelling right first interspace with erythema on top of foot.  Plantar palpation of the first interspace produced purulent drainage.     Neuro/Vasc: CNII-XII intact. Normal strength  Sensation and reflexes: Grossly intact  Pulses: Right: DP 1/4, PT 1/4, Left: DP 1/4, PT 1/4  Capillary Filling: 3 Sec, Edema: +1 right foot     Skin: Texture, Tone and Turgor: Diminished, Digital Hair: None  Atrophic Changes: Mild  Lesions: None  Nail Pathology: Mild dystrophic changes bilateral.  Ulcers/Wounds: Plantar aspect of first interspace noted to produce purulence through a small punctate hole which penetrates through the dermis full-thickness  into the subcutaneous tissues.  This was not appreciated with the superficial I&D that was previously performed as it was noted to be beneath undermined skin plantarly.  Upon exploration of this 1.5 cc of purulent drainage was expressed.  Erythema and calor of forefoot first interspace noted.                 Lab Results:   CBC w/diff  Results from last 7 days   Lab Units 05/17/24  0617 05/16/24  1836   WBC Thousand/uL 5.50 7.40   HEMOGLOBIN g/dL 11.8 11.8   HEMATOCRIT % 34.6* 35.7   PLATELETS Thousands/uL 175 197   SEGS PCT %  --  66   LYMPHO PCT %  --  25   MONO PCT %  --  8   EOS PCT %  --  1     BMP  Results from last 7 days   Lab Units 05/17/24  0617   POTASSIUM mmol/L 3.7   CHLORIDE mmol/L 98   CO2 mmol/L 29   BUN mg/dL 14   CREATININE mg/dL 0.83   CALCIUM mg/dL 8.7     CMP  Results from last 7 days   Lab Units 05/17/24  0617 05/16/24  1836 05/15/24  1627   POTASSIUM mmol/L 3.7   < > 4.0   CHLORIDE mmol/L 98   < > 92*   CO2 mmol/L 29   < > 30   BUN mg/dL 14   < > 13   CREATININE mg/dL 0.83   < > 1.00   CALCIUM mg/dL 8.7   < > 9.2   ALK PHOS U/L  --   --  97   ALT U/L  --   --  14   AST U/L  --   --  12*    < > = values in this interval not displayed.     @Culture@  Lab Results   Component Value Date    BLOODCX Received in Microbiology Lab. Culture in Progress. 05/16/2024    BLOODCX Received in Microbiology Lab. Culture in Progress. 05/16/2024    BLOODCX No Growth at 24 hrs. 05/15/2024    BLOODCX No Growth at 24 hrs. 05/15/2024     Lab Results   Component Value Date    WOUNDCULT 4+ Growth of Streptococcus (A) 05/15/2024         Imaging: I have personally reviewed pertinent films in PACS      EKG, Pathology, and Other Studies: I have personally reviewed pertinent reports.      Code Status: Level 1 - Full Code  Advance Directive and Living Will:      Power of :      Please Note: Voice to text dictation software was used in the creation of this document.       Zack Lam DPM

## 2024-05-17 NOTE — ASSESSMENT & PLAN NOTE
"Developed mild pain on 5/13 with development of erythema and swelling on 5/15  Was seen in the ED on 5/15 and offered admission, however patient refused so abscess was drained and she was placed on clindamycin, return 5/16 due to worsened pain  CT lower extremity 5/15: \"Shallow soft tissue ulceration between the first and second digits with 1.0 cm rim-enhancing collection in the first webspace. \"  Wound culture obtained 5/15 in the ED, growing gram-positive cocci in pairs and chains   ESR 69, .9  Hold on MRI at this time - pt has implanted loop recorder   Continue vancomycin and cefepime  Podiatry consulted  N.p.o. at midnight in case operative intervention required  "

## 2024-05-17 NOTE — ASSESSMENT & PLAN NOTE
Follows with [facility] cardiology  Examines euvolemic on admit  Maintained on Lasix 20 Mg daily, hold until surgical decision made by podiatry   119

## 2024-05-17 NOTE — PLAN OF CARE
Problem: Potential for Falls  Goal: Patient will remain free of falls  Description: INTERVENTIONS:  - Educate patient/family on patient safety including physical limitations  - Instruct patient to call for assistance with activity   - Consult OT/PT to assist with strengthening/mobility   - Keep Call bell within reach  - Keep bed low and locked with side rails adjusted as appropriate  - Keep care items and personal belongings within reach  - Initiate and maintain comfort rounds  - Make Fall Risk Sign visible to staff  - Offer Toileting every 2 Hours, in advance of need  - Initiate/Maintain bed/chair alarm  - Obtain necessary fall risk management equipment: yellow bracelet and yellow socks   - Apply yellow socks and bracelet for high fall risk patients  - Consider moving patient to room near nurses station  Outcome: Progressing     Problem: PAIN - ADULT  Goal: Verbalizes/displays adequate comfort level or baseline comfort level  Description: Interventions:  - Encourage patient to monitor pain and request assistance  - Assess pain using appropriate pain scale  - Administer analgesics based on type and severity of pain and evaluate response  - Implement non-pharmacological measures as appropriate and evaluate response  - Consider cultural and social influences on pain and pain management  - Notify physician/advanced practitioner if interventions unsuccessful or patient reports new pain  Outcome: Progressing     Problem: INFECTION - ADULT  Goal: Absence or prevention of progression during hospitalization  Description: INTERVENTIONS:  - Assess and monitor for signs and symptoms of infection  - Monitor lab/diagnostic results  - Monitor all insertion sites, i.e. indwelling lines, tubes, and drains  - Monitor endotracheal if appropriate and nasal secretions for changes in amount and color  - [place] appropriate cooling/warming therapies per order  - Administer medications as ordered  - Instruct and encourage patient and  family to use good hand hygiene technique  - Identify and instruct in appropriate isolation precautions for identified infection/condition  Outcome: Progressing  Goal: Absence of fever/infection during neutropenic period  Description: INTERVENTIONS:  - Monitor WBC    Outcome: Progressing     Problem: DISCHARGE PLANNING  Goal: Discharge to home or other facility with appropriate resources  Description: INTERVENTIONS:  - Identify barriers to discharge w/patient and caregiver  - Arrange for needed discharge resources and transportation as appropriate  - Identify discharge learning needs (meds, wound care, etc.)  - Arrange for interpretive services to assist at discharge as needed  - Refer to Case Management Department for coordinating discharge planning if the patient needs post-hospital services based on physician/advanced practitioner order or complex needs related to functional status, cognitive ability, or social support system  Outcome: Progressing     Problem: Knowledge Deficit  Goal: Patient/family/caregiver demonstrates understanding of disease process, treatment plan, medications, and discharge instructions  Description: Complete learning assessment and assess knowledge base.  Interventions:  - Provide teaching at level of understanding  - Provide teaching via preferred learning methods  Outcome: Progressing

## 2024-05-17 NOTE — CASE MANAGEMENT
Case Management Assessment & Discharge Planning Note    Patient name Jackie Cunningham  Location ED 05/ED 05 MRN [id number]  : 1945 Date 2024       Current Admission Date: 2024  Current Admission Diagnosis:Cellulitis and abscess of foot   Patient Active Problem List    Diagnosis Date Noted Date Diagnosed    Hyperglycemia 05/15/2024     Cellulitis and abscess of foot 05/15/2024     Primary hypertension 05/15/2024     Depression 05/15/2024     Hypercoagulable state (HCC) 2024     Hypertrophic cardiomyopathy (HCC) 10/26/2023     PAF (paroxysmal atrial fibrillation) (HCC) 10/02/2023     Type 2 diabetes mellitus (HCC) 2014       LOS (days): 1  Geometric Mean LOS (GMLOS) (days):   Days to GMLOS:     OBJECTIVE:    Risk of Unplanned Readmission Score: 11.88         Current admission status: Inpatient       Preferred Pharmacy:   [id number] - [place] - [address] [address]  Phone: [phone] Fax: [phone]    Primary Care Provider: Mei Rush MD    Primary Insurance: MEDICARE  Secondary Insurance: AAR    ASSESSMENT:  Active Health Care Proxies       Jamie Cunningham Alternate Health Care Agent - Son   Primary Phone: [phone] (Mobile)  Home Phone: [phone]  Work Phone: [phone]                 Advance Directives  Does patient have a Health Care POA?: Yes  Does patient have Advance Directives?: Yes  Advance Directives: Power of  for health care              Patient Information  Admitted from:: Home  Mental Status: Alert  During Assessment patient was accompanied by: Not accompanied during assessment  Assessment information provided by:: Patient  Primary Caregiver: Self  Support Systems: Self, Son  County of Residence: [place]  What city do you live in?: [place]  Home entry access options. Select all that apply.: No steps to enter home  Type of Current Residence: [place]  Living Arrangements: Lives Alone  Is  patient a ?: No    Activities of Daily Living Prior to Admission  Functional Status: Independent  Completes ADLs independently?: Yes  Ambulates independently?: Yes  Does patient use assisted devices?: No  Does patient currently own DME?: No  Does patient have a history of Outpatient Therapy (PT/OT)?: No  Does the patient have a history of Short-Term Rehab?: No  Does patient have a history of HHC?: No  Does patient currently have HHC?: No         Patient Information Continued  Income Source: Pension/skilled nursing  Does patient have prescription coverage?: Yes  Does patient receive dialysis treatments?: No  Does patient have a history of substance abuse?: No  Does patient have a history of Mental Health Diagnosis?: No         Means of Transportation  Means of Transport to Appts:: Drives Self      Social Determinants of Health (SDOH)      Flowsheet Row Most Recent Value   Housing Stability    In the last 12 months, was there a time when you were not able to pay the mortgage or rent on time? N   In the past 12 months, how many times have you moved where you were living? 1   At any time in the past 12 months, were you homeless or living in a shelter (including now)? N   Transportation Needs    In the past 12 months, has lack of transportation kept you from medical appointments or from getting medications? no   In the past 12 months, has lack of transportation kept you from meetings, work, or from getting things needed for daily living? No   Food Insecurity    Within the past 12 months, you worried that your food would run out before you got the money to buy more. Never true   Within the past 12 months, the food you bought just didn't last and you didn't have money to get more. Never true   Utilities    In the past 12 months has the electric, gas, oil, or water company threatened to shut off services in your home? No            DISCHARGE DETAILS:    Discharge planning discussed with:: Pt at bedside  [place] of Choice:  Yes     CM contacted family/caregiver?: No- see comments  Were Treatment Team discharge recommendations reviewed with patient/caregiver?: Yes  Did patient/caregiver verbalize understanding of patient care needs?: Yes  Were patient/caregiver advised of the risks associated with not following Treatment Team discharge recommendations?: Yes         Requested Home Health Care         Is the patient interested in HHC at discharge?: No    DME Referral Provided  Referral made for DME?: No         Additional Comments: Met with pt in ED to review CM role and possible discharge planning needs. Pt lives alone in a 1SH with no steps to enter. Pt has been independent wt all ADL care prior to admission. Pt reports that she has caregivers in the home for 6 hours per day to help the al the housework and meals. Pt has no hx wtih DME, HHC, or STR placement. Pt does drive. Pt has abscess of the foot and may need PT OT evaluation. Made pt aware of CM availability for ongoing discharge planning needs. Pt's family to provide transport to home.

## 2024-05-18 LAB
ANION GAP SERPL CALCULATED.3IONS-SCNC: 8 MMOL/L (ref 4–13)
BACTERIA WND AEROBE CULT: ABNORMAL
BACTERIA WND AEROBE CULT: ABNORMAL
BASOPHILS # BLD AUTO: 0.03 THOUSANDS/ÂΜL (ref 0–0.1)
BASOPHILS NFR BLD AUTO: 1 % (ref 0–1)
BUN SERPL-MCNC: 13 MG/DL (ref 5–25)
CALCIUM SERPL-MCNC: 8.6 MG/DL (ref 8.4–10.2)
CHLORIDE SERPL-SCNC: 100 MMOL/L (ref 96–108)
CO2 SERPL-SCNC: 27 MMOL/L (ref 21–32)
CREAT SERPL-MCNC: 0.88 MG/DL (ref 0.6–1.3)
EOSINOPHIL # BLD AUTO: 0.11 THOUSAND/ÂΜL (ref 0–0.61)
EOSINOPHIL NFR BLD AUTO: 2 % (ref 0–6)
ERYTHROCYTE [DISTWIDTH] IN BLOOD BY AUTOMATED COUNT: 11.6 % (ref 11.6–15.1)
GFR SERPL CREATININE-BSD FRML MDRD: 64 ML/MIN/1.73SQ M
GLUCOSE SERPL-MCNC: 163 MG/DL (ref 65–140)
GLUCOSE SERPL-MCNC: 183 MG/DL (ref 65–140)
GLUCOSE SERPL-MCNC: 200 MG/DL (ref 65–140)
GLUCOSE SERPL-MCNC: 272 MG/DL (ref 65–140)
GLUCOSE SERPL-MCNC: 330 MG/DL (ref 65–140)
GRAM STN SPEC: ABNORMAL
GRAM STN SPEC: ABNORMAL
HCT VFR BLD AUTO: 35 % (ref 34.8–46.1)
HGB BLD-MCNC: 11.7 G/DL (ref 11.5–15.4)
IMM GRANULOCYTES # BLD AUTO: 0.02 THOUSAND/UL (ref 0–0.2)
IMM GRANULOCYTES NFR BLD AUTO: 0 % (ref 0–2)
LYMPHOCYTES # BLD AUTO: 1.32 THOUSANDS/ÂΜL (ref 0.6–4.47)
LYMPHOCYTES NFR BLD AUTO: 29 % (ref 14–44)
MCH RBC QN AUTO: 29.7 PG (ref 26.8–34.3)
MCHC RBC AUTO-ENTMCNC: 33.4 G/DL (ref 31.4–37.4)
MCV RBC AUTO: 89 FL (ref 82–98)
MONOCYTES # BLD AUTO: 0.46 THOUSAND/ÂΜL (ref 0.17–1.22)
MONOCYTES NFR BLD AUTO: 10 % (ref 4–12)
NEUTROPHILS # BLD AUTO: 2.62 THOUSANDS/ÂΜL (ref 1.85–7.62)
NEUTS SEG NFR BLD AUTO: 58 % (ref 43–75)
NRBC BLD AUTO-RTO: 0 /100 WBCS
PLATELET # BLD AUTO: 190 THOUSANDS/UL (ref 149–390)
PMV BLD AUTO: 10 FL (ref 8.9–12.7)
POTASSIUM SERPL-SCNC: 4 MMOL/L (ref 3.5–5.3)
RBC # BLD AUTO: 3.94 MILLION/UL (ref 3.81–5.12)
SODIUM SERPL-SCNC: 135 MMOL/L (ref 135–147)
VANCOMYCIN SERPL-MCNC: 7.9 UG/ML (ref 10–20)
WBC # BLD AUTO: 4.56 THOUSAND/UL (ref 4.31–10.16)

## 2024-05-18 PROCEDURE — 82948 REAGENT STRIP/BLOOD GLUCOSE: CPT

## 2024-05-18 PROCEDURE — 99024 POSTOP FOLLOW-UP VISIT: CPT | Performed by: PODIATRIST

## 2024-05-18 PROCEDURE — 85025 COMPLETE CBC W/AUTO DIFF WBC: CPT | Performed by: PHYSICIAN ASSISTANT

## 2024-05-18 PROCEDURE — 99232 SBSQ HOSP IP/OBS MODERATE 35: CPT | Performed by: INTERNAL MEDICINE

## 2024-05-18 PROCEDURE — 80202 ASSAY OF VANCOMYCIN: CPT | Performed by: PHYSICIAN ASSISTANT

## 2024-05-18 PROCEDURE — NC001 PR NO CHARGE: Performed by: PODIATRIST

## 2024-05-18 PROCEDURE — 80048 BASIC METABOLIC PNL TOTAL CA: CPT | Performed by: PHYSICIAN ASSISTANT

## 2024-05-18 RX ORDER — INSULIN LISPRO 100 [IU]/ML
3 INJECTION, SOLUTION INTRAVENOUS; SUBCUTANEOUS
Status: DISCONTINUED | OUTPATIENT
Start: 2024-05-18 | End: 2024-05-19

## 2024-05-18 RX ORDER — INSULIN LISPRO 100 [IU]/ML
1-5 INJECTION, SOLUTION INTRAVENOUS; SUBCUTANEOUS
Status: DISCONTINUED | OUTPATIENT
Start: 2024-05-18 | End: 2024-05-20 | Stop reason: HOSPADM

## 2024-05-18 RX ORDER — INSULIN GLARGINE 100 [IU]/ML
15 INJECTION, SOLUTION SUBCUTANEOUS EVERY MORNING
Status: DISCONTINUED | OUTPATIENT
Start: 2024-05-18 | End: 2024-05-19

## 2024-05-18 RX ADMIN — AMIODARONE HYDROCHLORIDE 200 MG: 200 TABLET ORAL at 08:45

## 2024-05-18 RX ADMIN — GABAPENTIN 100 MG: 100 CAPSULE ORAL at 08:45

## 2024-05-18 RX ADMIN — APIXABAN 5 MG: 5 TABLET, FILM COATED ORAL at 17:24

## 2024-05-18 RX ADMIN — INSULIN LISPRO 1 UNITS: 100 INJECTION, SOLUTION INTRAVENOUS; SUBCUTANEOUS at 06:00

## 2024-05-18 RX ADMIN — INSULIN LISPRO 4 UNITS: 100 INJECTION, SOLUTION INTRAVENOUS; SUBCUTANEOUS at 11:41

## 2024-05-18 RX ADMIN — SERTRALINE HYDROCHLORIDE 25 MG: 50 TABLET ORAL at 08:45

## 2024-05-18 RX ADMIN — Medication 1000 UNITS: at 08:45

## 2024-05-18 RX ADMIN — FAMOTIDINE 40 MG: 20 TABLET, FILM COATED ORAL at 08:46

## 2024-05-18 RX ADMIN — INSULIN LISPRO 3 UNITS: 100 INJECTION, SOLUTION INTRAVENOUS; SUBCUTANEOUS at 17:25

## 2024-05-18 RX ADMIN — VANCOMYCIN HYDROCHLORIDE 1500 MG: 5 INJECTION, POWDER, LYOPHILIZED, FOR SOLUTION INTRAVENOUS at 08:47

## 2024-05-18 RX ADMIN — INSULIN GLARGINE 15 UNITS: 100 INJECTION, SOLUTION SUBCUTANEOUS at 11:40

## 2024-05-18 RX ADMIN — INSULIN LISPRO 1 UNITS: 100 INJECTION, SOLUTION INTRAVENOUS; SUBCUTANEOUS at 20:45

## 2024-05-18 RX ADMIN — ACETAMINOPHEN 650 MG: 325 TABLET, FILM COATED ORAL at 13:49

## 2024-05-18 RX ADMIN — INSULIN LISPRO 3 UNITS: 100 INJECTION, SOLUTION INTRAVENOUS; SUBCUTANEOUS at 17:24

## 2024-05-18 RX ADMIN — ATENOLOL 25 MG: 50 TABLET ORAL at 08:45

## 2024-05-18 RX ADMIN — INSULIN LISPRO 3 UNITS: 100 INJECTION, SOLUTION INTRAVENOUS; SUBCUTANEOUS at 11:41

## 2024-05-18 RX ADMIN — LEVOTHYROXINE SODIUM 100 MCG: 25 TABLET ORAL at 06:00

## 2024-05-18 RX ADMIN — PANTOPRAZOLE SODIUM 40 MG: 40 TABLET, DELAYED RELEASE ORAL at 08:45

## 2024-05-18 NOTE — PLAN OF CARE
Problem: Potential for Falls  Goal: Patient will remain free of falls  Description: INTERVENTIONS:  - Educate patient/family on patient safety including physical limitations  - Instruct patient to call for assistance with activity   - Consult OT/PT to assist with strengthening/mobility   - Keep Call bell within reach  - Keep bed low and locked with side rails adjusted as appropriate  - Keep care items and personal belongings within reach  - Initiate and maintain comfort rounds  - Make Fall Risk Sign visible to staff  - Offer Toileting every 2 Hours, in advance of need  - Initiate/Maintain alarm  - Obtain necessary fall risk management equipment:   - Apply yellow socks and bracelet for high fall risk patients  - Consider moving patient to room near nurses station  Outcome: Progressing     Problem: PAIN - ADULT  Goal: Verbalizes/displays adequate comfort level or baseline comfort level  Description: Interventions:  - Encourage patient to monitor pain and request assistance  - Assess pain using appropriate pain scale  - Administer analgesics based on type and severity of pain and evaluate response  - Implement non-pharmacological measures as appropriate and evaluate response  - Consider cultural and social influences on pain and pain management  - Notify physician/advanced practitioner if interventions unsuccessful or patient reports new pain  Outcome: Progressing     Problem: INFECTION - ADULT  Goal: Absence or prevention of progression during hospitalization  Description: INTERVENTIONS:  - Assess and monitor for signs and symptoms of infection  - Monitor lab/diagnostic results  - Monitor all insertion sites, i.e. indwelling lines, tubes, and drains  - Monitor endotracheal if appropriate and nasal secretions for changes in amount and color  - [place] appropriate cooling/warming therapies per order  - Administer medications as ordered  - Instruct and encourage patient and family to use good hand hygiene  technique  - Identify and instruct in appropriate isolation precautions for identified infection/condition  Outcome: Progressing  Goal: Absence of fever/infection during neutropenic period  Description: INTERVENTIONS:  - Monitor WBC    Outcome: Progressing     Problem: DISCHARGE PLANNING  Goal: Discharge to home or other facility with appropriate resources  Description: INTERVENTIONS:  - Identify barriers to discharge w/patient and caregiver  - Arrange for needed discharge resources and transportation as appropriate  - Identify discharge learning needs (meds, wound care, etc.)  - Arrange for interpretive services to assist at discharge as needed  - Refer to Case Management Department for coordinating discharge planning if the patient needs post-hospital services based on physician/advanced practitioner order or complex needs related to functional status, cognitive ability, or social support system  Outcome: Progressing     Problem: Knowledge Deficit  Goal: Patient/family/caregiver demonstrates understanding of disease process, treatment plan, medications, and discharge instructions  Description: Complete learning assessment and assess knowledge base.  Interventions:  - Provide teaching at level of understanding  - Provide teaching via preferred learning methods  Outcome: Progressing

## 2024-05-18 NOTE — PROGRESS NOTES
Jackie Cunningham is a 78 y.o. female who is currently ordered Vancomycin IV with management by the Pharmacy Consult service.  Relevant clinical data and objective / subjective history reviewed.  Vancomycin Assessment:  Indication and Goal AUC/Trough: Soft tissue (goal -600, trough >10), -600, trough >10  Clinical Status: stable  Micro:     Renal Function:  SCr: 0.88 mg/dL  CrCl: 53.1 mL/min  Renal replacement: Not on dialysis  Days of Therapy: 3  Current Dose: 1250mg every 24 hours  Vancomycin Plan: random level resulted 7.9- extrapolated trough is <10. Thus, dose changed as below.  New Dosinmg every 24 hours  Estimated AUC: 491 mcg*hr/mL  Estimated Trough: 12 mcg/mL  Next Level: 24 at 0600  Renal Function Monitoring: Daily BMP and UOP  Pharmacy will continue to follow closely for s/sx of nephrotoxicity, infusion reactions and appropriateness of therapy.  BMP and CBC will be ordered per protocol. We will continue to follow the patient’s culture results and clinical progress daily.    Trip Torre, Pharmacist, PharmD, BCPS

## 2024-05-18 NOTE — ASSESSMENT & PLAN NOTE
Lab Results   Component Value Date    HGBA1C 13.5 (H) 05/16/2024       Recent Labs     05/17/24  0608 05/17/24  1615 05/17/24  2231 05/18/24  0559   POCGLU 187* 208* 263* 183*       Blood Sugar Average: Last 72 hrs:  (P) 251Home regimen: Ozempic and SSI at home    Continue with Lantus 15 units at a.m.  Insulin lispro 3 units 3 times daily AC  Additional coverage with insulin on sliding scale  Diabetic diet, frequent Accu-Cheks and hypoglycemia protocol

## 2024-05-18 NOTE — PLAN OF CARE
Problem: Potential for Falls  Goal: Patient will remain free of falls  Description: INTERVENTIONS:  - Educate patient/family on patient safety including physical limitations  - Instruct patient to call for assistance with activity   - Consult OT/PT to assist with strengthening/mobility   - Keep Call bell within reach  - Keep bed low and locked with side rails adjusted as appropriate  - Keep care items and personal belongings within reach  - Initiate and maintain comfort rounds  - Make Fall Risk Sign visible to staff  - Offer Toileting every 2 Hours, in advance of need  - Initiate/Maintain alarm  - Obtain necessary fall risk management equipment:   - Apply yellow socks and bracelet for high fall risk patients  - Consider moving patient to room near nurses station  Outcome: Progressing     Problem: PAIN - ADULT  Goal: Verbalizes/displays adequate comfort level or baseline comfort level  Description: Interventions:  - Encourage patient to monitor pain and request assistance  - Assess pain using appropriate pain scale  - Administer analgesics based on type and severity of pain and evaluate response  - Implement non-pharmacological measures as appropriate and evaluate response  - Consider cultural and social influences on pain and pain management  - Notify physician/advanced practitioner if interventions unsuccessful or patient reports new pain  Outcome: Progressing     Problem: INFECTION - ADULT  Goal: Absence or prevention of progression during hospitalization  Description: INTERVENTIONS:  - Assess and monitor for signs and symptoms of infection  - Monitor lab/diagnostic results  - Monitor all insertion sites, i.e. indwelling lines, tubes, and drains  - Monitor endotracheal if appropriate and nasal secretions for changes in amount and color  - [place] appropriate cooling/warming therapies per order  - Administer medications as ordered  - Instruct and encourage patient and family to use good hand hygiene  technique  - Identify and instruct in appropriate isolation precautions for identified infection/condition  Outcome: Progressing  Goal: Absence of fever/infection during neutropenic period  Description: INTERVENTIONS:  - Monitor WBC    Outcome: Progressing     Problem: DISCHARGE PLANNING  Goal: Discharge to home or other facility with appropriate resources  Description: INTERVENTIONS:  - Identify barriers to discharge w/patient and caregiver  - Arrange for needed discharge resources and transportation as appropriate  - Identify discharge learning needs (meds, wound care, etc.)  - Arrange for interpretive services to assist at discharge as needed  - Refer to Case Management Department for coordinating discharge planning if the patient needs post-hospital services based on physician/advanced practitioner order or complex needs related to functional status, cognitive ability, or social support system  Outcome: Progressing     Problem: Knowledge Deficit  Goal: Patient/family/caregiver demonstrates understanding of disease process, treatment plan, medications, and discharge instructions  Description: Complete learning assessment and assess knowledge base.  Interventions:  - Provide teaching at level of understanding  - Provide teaching via preferred learning methods  Outcome: Progressing

## 2024-05-18 NOTE — PROGRESS NOTES
"[facility]  Progress Note  Name: Jackie Cunningham I  MRN: [id number]  Unit/Bed#: -01 I Date of Admission: 5/16/2024   Date of Service: 5/18/2024 I Hospital Day: 2    Assessment & Plan   * Cellulitis and abscess of foot  Assessment & Plan  Developed mild pain on 5/13 with development of erythema and swelling on 5/15  Was seen in the ED on 5/15 and offered admission, however patient refused so abscess was drained and she was placed on clindamycin, return 5/16 due to worsened pain  CT lower extremity 5/15: \"Shallow soft tissue ulceration between the first and second digits with 1.0 cm rim-enhancing collection in the first webspace. \"  Wound culture obtained 5/15 in the ED, positive for Streptococcus   ESR 69, .9    Previously on IV vancomycin and cefepime, simplified to IV vancomycin-day #3  Status post I&D first interspace by podiatry on 5/17  Appreciate podiatry assistance    Hypertrophic cardiomyopathy (HCC)  Assessment & Plan  Follows with [facility] cardiology  Examines euvolemic on admit  Maintained on Lasix 20 Mg daily    Type 2 diabetes mellitus (HCC)  Assessment & Plan  Lab Results   Component Value Date    HGBA1C 13.5 (H) 05/16/2024       Recent Labs     05/17/24  0608 05/17/24  1615 05/17/24  2231 05/18/24  0559   POCGLU 187* 208* 263* 183*       Blood Sugar Average: Last 72 hrs:  (P) 251Home regimen: Ozempic and SSI at home    Continue with Lantus 15 units at a.m.  Insulin lispro 3 units 3 times daily AC  Additional coverage with insulin on sliding scale  Diabetic diet, frequent Accu-Cheks and hypoglycemia protocol      PAF (paroxysmal atrial fibrillation) ([facility])  Assessment & Plan  Status post ablation 12/2023  Home regimen: Amiodarone 200 Mg daily, atenolol 25 Mg daily, and anticoagulation with Eliquis 5 Mg twice daily  Continue home amiodarone and atenolol-hold Eliquis in case of need for OR procedure  Rate controlled and in normal sinus rhythm on " admit    Depression  Assessment & Plan  Continue home Zoloft 25 Mg daily    Primary hypertension  Assessment & Plan  Home regimen: Atenolol 25 Mg daily, Lasix 20 Mg daily, losartan 100 Mg daily  Continue home regimen  /77   Pulse 70   Temp 98.4 °F (36.9 °C) (Oral)   Resp 18   Wt 79.3 kg (174 lb 14.4 oz)   SpO2 97%   BMI 31.99 kg/m²   Vitals as per routine               VTE Pharmacologic Prophylaxis: VTE Score: 5 Moderate Risk (Score 3-4) - Pharmacological DVT Prophylaxis Contraindicated. Sequential Compression Devices Ordered.    Mobility:   Basic Mobility Inpatient Raw Score: 24  JH-HLM Goal: 8: Walk 250 feet or more  JH-HLM Achieved: 6: Walk 10 steps or more  JH-HLM Goal achieved. Continue to encourage appropriate mobility.    Patient Centered Rounds: I performed bedside rounds with nursing staff today.   Discussions with Specialists or Other Care Team Provider: yes    Education and Discussions with Family / Patient:  yes.     Total Time Spent on Date of Encounter in care of patient: 39 mins. This time was spent on one or more of the following: performing physical exam; counseling and coordination of care; obtaining or reviewing history; documenting in the medical record; reviewing/ordering tests, medications or procedures; communicating with other healthcare professionals and discussing with patient's family/caregivers.    Current Length of Stay: 2 day(s)  Current Patient Status: Inpatient   Certification Statement: The patient will continue to require additional inpatient hospital stay due to pending stabilization  Discharge Plan:  Until stabilized and cleared by podiatry    Code Status: Level 1 - Full Code    Subjective:   Seen and examined at bedside  Awake, alert and oriented  On room air  Denies any complaints      Objective:     Vitals:   Temp (24hrs), Av.5 °F (36.4 °C), Min:96.6 °F (35.9 °C), Max:98.4 °F (36.9 °C)    Temp:  [96.6 °F (35.9 °C)-98.4 °F (36.9 °C)] 98.4 °F (36.9 °C)  HR:   [70-75] 70  Resp:  [18] 18  BP: (117-159)/(76-80) 159/77  SpO2:  [94 %-97 %] 97 %  Body mass index is 31.99 kg/m².     Input and Output Summary (last 24 hours):     Intake/Output Summary (Last 24 hours) at 5/18/2024 1122  Last data filed at 5/18/2024 0900  Gross per 24 hour   Intake 600 ml   Output 450 ml   Net 150 ml       Physical Exam:   Physical Exam  Vitals reviewed.   Constitutional:       Appearance: She is obese.   HENT:      Head: Atraumatic.      Mouth/Throat:      Mouth: Mucous membranes are dry.   Eyes:      General: No scleral icterus.  Cardiovascular:      Rate and Rhythm: Normal rate.      Heart sounds: Normal heart sounds.   Pulmonary:      Effort: No respiratory distress.   Abdominal:      General: Bowel sounds are normal.   Musculoskeletal:      Right lower leg: No edema.      Left lower leg: No edema.   Skin:     Capillary Refill: Capillary refill takes less than 2 seconds.      Findings: Lesion present.   Neurological:      Mental Status: She is alert and oriented to person, place, and time.   Psychiatric:         Mood and Affect: Mood normal.          Additional Data:     Labs:  Results from last 7 days   Lab Units 05/18/24  0433   WBC Thousand/uL 4.56   HEMOGLOBIN g/dL 11.7   HEMATOCRIT % 35.0   PLATELETS Thousands/uL 190   SEGS PCT % 58   LYMPHO PCT % 29   MONO PCT % 10   EOS PCT % 2     Results from last 7 days   Lab Units 05/18/24  0433 05/16/24  1836 05/15/24  1627   SODIUM mmol/L 135   < > 131*   POTASSIUM mmol/L 4.0   < > 4.0   CHLORIDE mmol/L 100   < > 92*   CO2 mmol/L 27   < > 30   BUN mg/dL 13   < > 13   CREATININE mg/dL 0.88   < > 1.00   ANION GAP mmol/L 8   < > 9   CALCIUM mg/dL 8.6   < > 9.2   ALBUMIN g/dL  --   --  4.0   TOTAL BILIRUBIN mg/dL  --   --  1.62*   ALK PHOS U/L  --   --  97   ALT U/L  --   --  14   AST U/L  --   --  12*   GLUCOSE RANDOM mg/dL 200*   < > 454*    < > = values in this interval not displayed.     Results from last 7 days   Lab Units 05/16/24  1836   INR   1.10     Results from last 7 days   Lab Units 05/18/24  0559 05/17/24  2231 05/17/24  1615 05/17/24  0608 05/17/24  0006 05/16/24  2112 05/15/24  1931   POC GLUCOSE mg/dl 183* 263* 208* 187* 379* 286* 304*     Results from last 7 days   Lab Units 05/16/24  1836   HEMOGLOBIN A1C % 13.5*     Results from last 7 days   Lab Units 05/16/24  1836 05/15/24  1627   LACTIC ACID mmol/L 1.5 2.0   PROCALCITONIN ng/ml <0.05 0.06       Lines/Drains:  Invasive Devices       Peripheral Intravenous Line  Duration             Peripheral IV 05/16/24 Distal;Left;Ventral (anterior) Wrist 1 day    Peripheral IV 05/16/24 Right;Ventral (anterior) Forearm 1 day                          Imaging: Reviewed radiology reports from this admission including: procedure reports    Recent Cultures (last 7 days):   Results from last 7 days   Lab Units 05/16/24  1836 05/15/24  2026 05/15/24  1634 05/15/24  1627   BLOOD CULTURE  No Growth at 24 hrs.  No Growth at 24 hrs.  --  No Growth at 48 hrs. No Growth at 48 hrs.   GRAM STAIN RESULT   --  3+ Gram positive cocci in pairs and chains*  Rare Polys*  --   --    WOUND CULTURE   --  4+ Growth of Streptococcus anginosus*  Few Colonies of  --   --        Last 24 Hours Medication List:   Current Facility-Administered Medications   Medication Dose Route Frequency Provider Last Rate    acetaminophen  650 mg Oral Q6H PRN Elva Gallego PA-C      aluminum-magnesium hydroxide-simethicone  30 mL Oral Q6H PRN Elva Gallego PA-C      amiodarone  200 mg Oral Daily Elva Gallego PA-C      atenolol  25 mg Oral Daily Elva Gallego PA-C      cholecalciferol  1,000 Units Oral Daily Elva Gallego PA-C      famotidine  40 mg Oral Every Other Day Elva Gallego PA-C      gabapentin  100 mg Oral Daily Elva Gallego PA-C      HYDROmorphone  0.2 mg Intravenous Q6H PRN Elva Gallego PA-C      insulin glargine  15 Units Subcutaneous QAM Maryellen Funez MD      insulin lispro  1-5 Units  Subcutaneous TID AC Maryellen Funez MD      insulin lispro  1-5 Units Subcutaneous HS Maryellen Funez MD      insulin lispro  3 Units Subcutaneous TID With Meals Maryellen Funez MD      levothyroxine  100 mcg Oral Early Morning Elva Gallego PA-C      ondansetron  4 mg Intravenous Q6H PRN Elva Gallego PA-C      oxyCODONE  2.5 mg Oral Q6H PRN Elva Gallego PA-C      Or    oxyCODONE  5 mg Oral Q6H PRN Elva Gallego PA-C      pantoprazole  40 mg Oral Daily Elva Gallego PA-C      senna  1 tablet Oral HS PRN Elva Gallego PA-C      sertraline  25 mg Oral Daily Elva Gallego PA-C      vancomycin  1,500 mg Intravenous Q24H Maryellen Funez MD          Today, Patient Was Seen By: Maryellen Funez MD    **Please Note: This note may have been constructed using a voice recognition system.**

## 2024-05-18 NOTE — ASSESSMENT & PLAN NOTE
"Developed mild pain on 5/13 with development of erythema and swelling on 5/15  Was seen in the ED on 5/15 and offered admission, however patient refused so abscess was drained and she was placed on clindamycin, return 5/16 due to worsened pain  CT lower extremity 5/15: \"Shallow soft tissue ulceration between the first and second digits with 1.0 cm rim-enhancing collection in the first webspace. \"  Wound culture obtained 5/15 in the ED, positive for Streptococcus   ESR 69, .9    Previously on IV vancomycin and cefepime, simplified to IV vancomycin-day #3  Status post I&D first interspace by podiatry on 5/17  Appreciate podiatry assistance  "

## 2024-05-18 NOTE — ASSESSMENT & PLAN NOTE
Status post ablation 12/2023  Home regimen: Amiodarone 200 Mg daily, atenolol 25 Mg daily, and anticoagulation with Eliquis 5 Mg twice daily  Continue home amiodarone and atenolol-hold Eliquis in case of need for OR procedure  Rate controlled and in normal sinus rhythm on admit

## 2024-05-18 NOTE — PROGRESS NOTES
Progress Note - Podiatry  Jackie Cunningham 78 y.o. female MRN: [id number]  Unit/Bed#: -01 Encounter: [id number]    Assessment:  Abscess right foot first interspace/cellulitis right foot  Type 2 diabetes with peripheral neuropathy  Hypertrophic cardiomyopathy, A-fib, depression, hypertension, chronically anticoagulated on Eliquis, implanted loop recorder    Plan:  Bilateral pedal examination at bedside.  I personally reviewed patient's medical records, ED visit note, admission H&P, podiatry consult from yesterday, pertinent blood work, CT scan, culture results.  Right foot with decreased cellulitis, packing was removed, wound was irrigated with saline, packing replaced with 1/4 inch plain packing, patient tolerated this well, dry dressing applied.  No further surgical intervention is necessary, we will continue with local wound care and monitor cellulitis for resolution prior to discharge.  Lengthy discussion was had with patient regarding her A1c of 13.5 and noncompliance with her diabetes, explained to her for proper healing is of utmost necessity she began with proper glycemic control and proper protein intake immediately, she will continue to have delayed healing secondary to her hyperglycemia and elevated A1c.  Continue IV antibiotics per primary team.  May resume Eliquis  May weight-bear as tolerated with Darco wedge shoe.  PT and shoe orders placed  We will revisit with patient on Monday, if cellulitis is resolved she will be stable for discharge home on oral antibiotics and will follow outpatient wound management.    Subjective/Objective   Chief Complaint:   Chief Complaint   Patient presents with    Wound Check     Pt to er with reports that she was in the er yesterday afternoon and had a wound lanced between her toes. Was told that she should get admitted to watch for infection and she denied. States that her foot is now having increased pain.        Subjective: Patient seen at bedside for abscess right  first interspace, post I&D at bedside yesterday, she states her foot is feeling better, not as painful, no nausea, vomiting, fever, chills.    Blood pressure 159/77, pulse 70, temperature 98.4 °F (36.9 °C), temperature source Oral, resp. rate 18, weight 79.3 kg (174 lb 14.4 oz), SpO2 97%.,Body mass index is 31.99 kg/m².    Invasive Devices       Peripheral Intravenous Line  Duration             Peripheral IV 05/16/24 Distal;Left;Ventral (anterior) Wrist 1 day    Peripheral IV 05/16/24 Right;Ventral (anterior) Forearm 1 day                    Physical Exam:   General appearance: alert, cooperative and no distress    Right foot first interspace with no purulence, no malodor, upon removal of packing there is a granular base present, unable to express any further drainage, cellulitis is significantly decreased from yesterday, some residual cellulitis dorsal first MTPJ is noted, no fluctuance, no signs of abscess, no crepitus                            Lab, Imaging and other studies:   Admission on 05/16/2024   Component Date Value    WBC 05/16/2024 7.40     RBC 05/16/2024 4.01     Hemoglobin 05/16/2024 11.8     Hematocrit 05/16/2024 35.7     MCV 05/16/2024 89     MCH 05/16/2024 29.4     MCHC 05/16/2024 33.1     RDW 05/16/2024 11.9     MPV 05/16/2024 10.4     Platelets 05/16/2024 197     nRBC 05/16/2024 0     Segmented % 05/16/2024 66     Immature Grans % 05/16/2024 0     Lymphocytes % 05/16/2024 25     Monocytes % 05/16/2024 8     Eosinophils Relative 05/16/2024 1     Basophils Relative 05/16/2024 0     Absolute Neutrophils 05/16/2024 4.81     Absolute Immature Grans 05/16/2024 0.03     Absolute Lymphocytes 05/16/2024 1.85     Absolute Monocytes 05/16/2024 0.61     Eosinophils Absolute 05/16/2024 0.07     Basophils Absolute 05/16/2024 0.03     LACTIC ACID 05/16/2024 1.5     Procalcitonin 05/16/2024 <0.05     Protime 05/16/2024 14.7 (H)     INR 05/16/2024 1.10     PTT 05/16/2024 34     Blood Culture 05/16/2024 No Growth  at 24 hrs.     Blood Culture 05/16/2024 No Growth at 24 hrs.     Sodium 05/16/2024 132 (L)     Potassium 05/16/2024 3.8     Chloride 05/16/2024 96     CO2 05/16/2024 30     ANION GAP 05/16/2024 6     BUN 05/16/2024 10     Creatinine 05/16/2024 0.90     Glucose 05/16/2024 289 (H)     Calcium 05/16/2024 9.0     eGFR 05/16/2024 62     Sed Rate 05/16/2024 69 (H)     CRP 05/16/2024 126.9 (H)     Hemoglobin A1C 05/16/2024 13.5 (H)     EAG 05/16/2024 341     POC Glucose 05/16/2024 286 (H)     PTT 05/17/2024 52 (H)     POC Glucose 05/17/2024 379 (H)     Sodium 05/17/2024 135     Potassium 05/17/2024 3.7     Chloride 05/17/2024 98     CO2 05/17/2024 29     ANION GAP 05/17/2024 8     BUN 05/17/2024 14     Creatinine 05/17/2024 0.83     Glucose 05/17/2024 188 (H)     Calcium 05/17/2024 8.7     eGFR 05/17/2024 68     Magnesium 05/17/2024 1.9     Phosphorus 05/17/2024 3.4     WBC 05/17/2024 5.50     RBC 05/17/2024 3.91     Hemoglobin 05/17/2024 11.8     Hematocrit 05/17/2024 34.6 (L)     MCV 05/17/2024 89     MCH 05/17/2024 30.2     MCHC 05/17/2024 34.1     RDW 05/17/2024 12.0     Platelets 05/17/2024 175     MPV 05/17/2024 9.8     POC Glucose 05/17/2024 187 (H)     POC Glucose 05/17/2024 208 (H)     POC Glucose 05/17/2024 263 (H)     Vancomycin Rm 05/18/2024 7.9 (L)     WBC 05/18/2024 4.56     RBC 05/18/2024 3.94     Hemoglobin 05/18/2024 11.7     Hematocrit 05/18/2024 35.0     MCV 05/18/2024 89     MCH 05/18/2024 29.7     MCHC 05/18/2024 33.4     RDW 05/18/2024 11.6     MPV 05/18/2024 10.0     Platelets 05/18/2024 190     nRBC 05/18/2024 0     Segmented % 05/18/2024 58     Immature Grans % 05/18/2024 0     Lymphocytes % 05/18/2024 29     Monocytes % 05/18/2024 10     Eosinophils Relative 05/18/2024 2     Basophils Relative 05/18/2024 1     Absolute Neutrophils 05/18/2024 2.62     Absolute Immature Grans 05/18/2024 0.02     Absolute Lymphocytes 05/18/2024 1.32     Absolute Monocytes 05/18/2024 0.46     Eosinophils Absolute  05/18/2024 0.11     Basophils Absolute 05/18/2024 0.03     Sodium 05/18/2024 135     Potassium 05/18/2024 4.0     Chloride 05/18/2024 100     CO2 05/18/2024 27     ANION GAP 05/18/2024 8     BUN 05/18/2024 13     Creatinine 05/18/2024 0.88     Glucose 05/18/2024 200 (H)     Calcium 05/18/2024 8.6     eGFR 05/18/2024 64     POC Glucose 05/18/2024 183 (H)     POC Glucose 05/18/2024 330 (H)      Imaging: I have personally reviewed pertinent films in PACS  EKG, Pathology, and Other Studies: I have personally reviewed pertinent reports.  VTE Pharmacologic Prophylaxis: per primary team  VTE Mechanical Prophylaxis: sequential compression device

## 2024-05-18 NOTE — ASSESSMENT & PLAN NOTE
Home regimen: Atenolol 25 Mg daily, Lasix 20 Mg daily, losartan 100 Mg daily  Continue home regimen  /77   Pulse 70   Temp 98.4 °F (36.9 °C) (Oral)   Resp 18   Wt 79.3 kg (174 lb 14.4 oz)   SpO2 97%   BMI 31.99 kg/m²   Vitals as per routine

## 2024-05-19 LAB
ANION GAP SERPL CALCULATED.3IONS-SCNC: 6 MMOL/L (ref 4–13)
BASOPHILS # BLD AUTO: 0.04 THOUSANDS/ÂΜL (ref 0–0.1)
BASOPHILS NFR BLD AUTO: 1 % (ref 0–1)
BUN SERPL-MCNC: 13 MG/DL (ref 5–25)
CALCIUM SERPL-MCNC: 8.8 MG/DL (ref 8.4–10.2)
CHLORIDE SERPL-SCNC: 101 MMOL/L (ref 96–108)
CO2 SERPL-SCNC: 29 MMOL/L (ref 21–32)
CREAT SERPL-MCNC: 0.8 MG/DL (ref 0.6–1.3)
EOSINOPHIL # BLD AUTO: 0.1 THOUSAND/ÂΜL (ref 0–0.61)
EOSINOPHIL NFR BLD AUTO: 2 % (ref 0–6)
ERYTHROCYTE [DISTWIDTH] IN BLOOD BY AUTOMATED COUNT: 11.8 % (ref 11.6–15.1)
GFR SERPL CREATININE-BSD FRML MDRD: 71 ML/MIN/1.73SQ M
GLUCOSE SERPL-MCNC: 148 MG/DL (ref 65–140)
GLUCOSE SERPL-MCNC: 162 MG/DL (ref 65–140)
GLUCOSE SERPL-MCNC: 186 MG/DL (ref 65–140)
GLUCOSE SERPL-MCNC: 217 MG/DL (ref 65–140)
GLUCOSE SERPL-MCNC: 282 MG/DL (ref 65–140)
HCT VFR BLD AUTO: 35.5 % (ref 34.8–46.1)
HGB BLD-MCNC: 11.8 G/DL (ref 11.5–15.4)
IMM GRANULOCYTES # BLD AUTO: 0.03 THOUSAND/UL (ref 0–0.2)
IMM GRANULOCYTES NFR BLD AUTO: 1 % (ref 0–2)
LYMPHOCYTES # BLD AUTO: 1.52 THOUSANDS/ÂΜL (ref 0.6–4.47)
LYMPHOCYTES NFR BLD AUTO: 31 % (ref 14–44)
MCH RBC QN AUTO: 29.7 PG (ref 26.8–34.3)
MCHC RBC AUTO-ENTMCNC: 33.2 G/DL (ref 31.4–37.4)
MCV RBC AUTO: 89 FL (ref 82–98)
MONOCYTES # BLD AUTO: 0.51 THOUSAND/ÂΜL (ref 0.17–1.22)
MONOCYTES NFR BLD AUTO: 10 % (ref 4–12)
NEUTROPHILS # BLD AUTO: 2.76 THOUSANDS/ÂΜL (ref 1.85–7.62)
NEUTS SEG NFR BLD AUTO: 55 % (ref 43–75)
NRBC BLD AUTO-RTO: 0 /100 WBCS
PLATELET # BLD AUTO: 207 THOUSANDS/UL (ref 149–390)
PMV BLD AUTO: 10.1 FL (ref 8.9–12.7)
POTASSIUM SERPL-SCNC: 3.8 MMOL/L (ref 3.5–5.3)
RBC # BLD AUTO: 3.97 MILLION/UL (ref 3.81–5.12)
SODIUM SERPL-SCNC: 136 MMOL/L (ref 135–147)
WBC # BLD AUTO: 4.96 THOUSAND/UL (ref 4.31–10.16)

## 2024-05-19 PROCEDURE — 99232 SBSQ HOSP IP/OBS MODERATE 35: CPT | Performed by: INTERNAL MEDICINE

## 2024-05-19 PROCEDURE — 82948 REAGENT STRIP/BLOOD GLUCOSE: CPT

## 2024-05-19 PROCEDURE — 85025 COMPLETE CBC W/AUTO DIFF WBC: CPT | Performed by: PHYSICIAN ASSISTANT

## 2024-05-19 PROCEDURE — 80048 BASIC METABOLIC PNL TOTAL CA: CPT | Performed by: PHYSICIAN ASSISTANT

## 2024-05-19 RX ORDER — FUROSEMIDE 20 MG/1
20 TABLET ORAL DAILY
Status: DISCONTINUED | OUTPATIENT
Start: 2024-05-19 | End: 2024-05-20 | Stop reason: HOSPADM

## 2024-05-19 RX ORDER — INSULIN LISPRO 100 [IU]/ML
4 INJECTION, SOLUTION INTRAVENOUS; SUBCUTANEOUS
Status: DISCONTINUED | OUTPATIENT
Start: 2024-05-19 | End: 2024-05-20 | Stop reason: HOSPADM

## 2024-05-19 RX ORDER — LOSARTAN POTASSIUM 50 MG/1
100 TABLET ORAL DAILY
Status: DISCONTINUED | OUTPATIENT
Start: 2024-05-19 | End: 2024-05-20 | Stop reason: HOSPADM

## 2024-05-19 RX ORDER — INSULIN GLARGINE 100 [IU]/ML
20 INJECTION, SOLUTION SUBCUTANEOUS EVERY MORNING
Status: DISCONTINUED | OUTPATIENT
Start: 2024-05-20 | End: 2024-05-20 | Stop reason: HOSPADM

## 2024-05-19 RX ORDER — FUROSEMIDE 40 MG/1
40 TABLET ORAL DAILY
Status: DISCONTINUED | OUTPATIENT
Start: 2024-05-19 | End: 2024-05-19

## 2024-05-19 RX ADMIN — SERTRALINE HYDROCHLORIDE 25 MG: 50 TABLET ORAL at 09:22

## 2024-05-19 RX ADMIN — INSULIN LISPRO 1 UNITS: 100 INJECTION, SOLUTION INTRAVENOUS; SUBCUTANEOUS at 07:44

## 2024-05-19 RX ADMIN — INSULIN LISPRO 3 UNITS: 100 INJECTION, SOLUTION INTRAVENOUS; SUBCUTANEOUS at 21:58

## 2024-05-19 RX ADMIN — FUROSEMIDE 20 MG: 20 TABLET ORAL at 09:22

## 2024-05-19 RX ADMIN — Medication 1000 UNITS: at 09:23

## 2024-05-19 RX ADMIN — APIXABAN 5 MG: 5 TABLET, FILM COATED ORAL at 09:23

## 2024-05-19 RX ADMIN — INSULIN LISPRO 3 UNITS: 100 INJECTION, SOLUTION INTRAVENOUS; SUBCUTANEOUS at 07:45

## 2024-05-19 RX ADMIN — INSULIN GLARGINE 15 UNITS: 100 INJECTION, SOLUTION SUBCUTANEOUS at 09:32

## 2024-05-19 RX ADMIN — LOSARTAN POTASSIUM 100 MG: 50 TABLET, FILM COATED ORAL at 09:21

## 2024-05-19 RX ADMIN — AMIODARONE HYDROCHLORIDE 200 MG: 200 TABLET ORAL at 09:22

## 2024-05-19 RX ADMIN — GABAPENTIN 100 MG: 100 CAPSULE ORAL at 09:21

## 2024-05-19 RX ADMIN — VANCOMYCIN HYDROCHLORIDE 1500 MG: 5 INJECTION, POWDER, LYOPHILIZED, FOR SOLUTION INTRAVENOUS at 09:33

## 2024-05-19 RX ADMIN — ATENOLOL 25 MG: 50 TABLET ORAL at 09:23

## 2024-05-19 RX ADMIN — PANTOPRAZOLE SODIUM 40 MG: 40 TABLET, DELAYED RELEASE ORAL at 09:21

## 2024-05-19 RX ADMIN — INSULIN LISPRO 1 UNITS: 100 INJECTION, SOLUTION INTRAVENOUS; SUBCUTANEOUS at 11:57

## 2024-05-19 RX ADMIN — LEVOTHYROXINE SODIUM 100 MCG: 25 TABLET ORAL at 05:04

## 2024-05-19 RX ADMIN — INSULIN LISPRO 4 UNITS: 100 INJECTION, SOLUTION INTRAVENOUS; SUBCUTANEOUS at 17:23

## 2024-05-19 RX ADMIN — INSULIN LISPRO 4 UNITS: 100 INJECTION, SOLUTION INTRAVENOUS; SUBCUTANEOUS at 11:57

## 2024-05-19 RX ADMIN — INSULIN LISPRO 2 UNITS: 100 INJECTION, SOLUTION INTRAVENOUS; SUBCUTANEOUS at 17:23

## 2024-05-19 RX ADMIN — APIXABAN 5 MG: 5 TABLET, FILM COATED ORAL at 17:23

## 2024-05-19 NOTE — ASSESSMENT & PLAN NOTE
Lab Results   Component Value Date    HGBA1C 13.5 (H) 05/16/2024       Recent Labs     05/18/24  1139 05/18/24  1604 05/18/24 2047 05/19/24  0723   POCGLU 330* 272* 163* 162*       Blood Sugar Average: Last 72 hrs:  (P) 243.3Home regimen: Ozempic and SSI at home    Lantus 20 units ( increased from prior 15 units ) daily at a.m.  Insulin lispro 5 units ( increased from prior 3 units ) 3 times daily AC  Additional coverage with insulin on sliding scale  Diabetic diet, frequent Accu-Cheks and hypoglycemia protocol

## 2024-05-19 NOTE — ASSESSMENT & PLAN NOTE
Home regimen: Atenolol 25 Mg daily, Lasix 20 Mg daily, losartan 100 Mg daily  Continue home regimen  /77 (BP Location: Left arm)   Pulse 71   Temp 98.2 °F (36.8 °C) (Oral)   Resp 18   Wt 79.3 kg (174 lb 14.4 oz)   SpO2 95%   BMI 31.99 kg/m²   Vitals as per routine

## 2024-05-19 NOTE — PLAN OF CARE
Problem: Potential for Falls  Goal: Patient will remain free of falls  Description: INTERVENTIONS:  - Educate patient/family on patient safety including physical limitations  - Instruct patient to call for assistance with activity   - Consult OT/PT to assist with strengthening/mobility   - Keep Call bell within reach  - Keep bed low and locked with side rails adjusted as appropriate  - Keep care items and personal belongings within reach  - Initiate and maintain comfort rounds  - Make Fall Risk Sign visible to staff  - Offer Toileting every 2 Hours, in advance of need  - Initiate/Maintain alarm  - Obtain necessary fall risk management equipment:   - Apply yellow socks and bracelet for high fall risk patients  - Consider moving patient to room near nurses station  Outcome: Progressing     Problem: PAIN - ADULT  Goal: Verbalizes/displays adequate comfort level or baseline comfort level  Description: Interventions:  - Encourage patient to monitor pain and request assistance  - Assess pain using appropriate pain scale  - Administer analgesics based on type and severity of pain and evaluate response  - Implement non-pharmacological measures as appropriate and evaluate response  - Consider cultural and social influences on pain and pain management  - Notify physician/advanced practitioner if interventions unsuccessful or patient reports new pain  Outcome: Progressing     Problem: INFECTION - ADULT  Goal: Absence or prevention of progression during hospitalization  Description: INTERVENTIONS:  - Assess and monitor for signs and symptoms of infection  - Monitor lab/diagnostic results  - Monitor all insertion sites, i.e. indwelling lines, tubes, and drains  - Monitor endotracheal if appropriate and nasal secretions for changes in amount and color  - [place] appropriate cooling/warming therapies per order  - Administer medications as ordered  - Instruct and encourage patient and family to use good hand hygiene  technique  - Identify and instruct in appropriate isolation precautions for identified infection/condition  Outcome: Progressing  Goal: Absence of fever/infection during neutropenic period  Description: INTERVENTIONS:  - Monitor WBC    Outcome: Progressing     Problem: DISCHARGE PLANNING  Goal: Discharge to home or other facility with appropriate resources  Description: INTERVENTIONS:  - Identify barriers to discharge w/patient and caregiver  - Arrange for needed discharge resources and transportation as appropriate  - Identify discharge learning needs (meds, wound care, etc.)  - Arrange for interpretive services to assist at discharge as needed  - Refer to Case Management Department for coordinating discharge planning if the patient needs post-hospital services based on physician/advanced practitioner order or complex needs related to functional status, cognitive ability, or social support system  Outcome: Progressing     Problem: Knowledge Deficit  Goal: Patient/family/caregiver demonstrates understanding of disease process, treatment plan, medications, and discharge instructions  Description: Complete learning assessment and assess knowledge base.  Interventions:  - Provide teaching at level of understanding  - Provide teaching via preferred learning methods  Outcome: Progressing

## 2024-05-19 NOTE — PROGRESS NOTES
Jackie Cunningham is a 78 y.o. female who is currently ordered Vancomycin IV with management by the Pharmacy Consult service.  Relevant clinical data and objective / subjective history reviewed.  Vancomycin Assessment:  Indication and Goal AUC/Trough: Soft tissue (goal -600, trough >10), -600, trough >10  Clinical Status: stable  Micro:     Renal Function:  SCr: 0.8 mg/dL  CrCl: 58.4 mL/min  Renal replacement: Not on dialysis  Days of Therapy: 4  Current Dose: 1500mg every 24 hours  Vancomycin Plan:  New Dosing: No change  Estimated AUC: 473 mcg*hr/mL  Estimated Trough: 11.3 mcg/mL  Next Level: 5/21/24 at 0600  Renal Function Monitoring: Daily BMP and UOP  Pharmacy will continue to follow closely for s/sx of nephrotoxicity, infusion reactions and appropriateness of therapy.  BMP and CBC will be ordered per protocol.     De-escalate antibiotic if appropriate according to culture results. We will continue to follow the patient’s culture results and clinical progress daily.    Trip Torre, Pharmacist, PharmD, BCPS

## 2024-05-19 NOTE — PLAN OF CARE
Problem: Potential for Falls  Goal: Patient will remain free of falls  Description: INTERVENTIONS:  - Educate patient/family on patient safety including physical limitations  - Instruct patient to call for assistance with activity   - Consult OT/PT to assist with strengthening/mobility   - Keep Call bell within reach  - Keep bed low and locked with side rails adjusted as appropriate  - Keep care items and personal belongings within reach  - Initiate and maintain comfort rounds  - Make Fall Risk Sign visible to staff  - Offer Toileting every 2 Hours, in advance of need  - Initiate/Maintain 2 alarm  - Obtain necessary fall risk management equipment:  - Apply yellow socks and bracelet for high fall risk patients  - Consider moving patient to room near nurses station  Outcome: Progressing     Problem: PAIN - ADULT  Goal: Verbalizes/displays adequate comfort level or baseline comfort level  Description: Interventions:  - Encourage patient to monitor pain and request assistance  - Assess pain using appropriate pain scale  - Administer analgesics based on type and severity of pain and evaluate response  - Implement non-pharmacological measures as appropriate and evaluate response  - Consider cultural and social influences on pain and pain management  - Notify physician/advanced practitioner if interventions unsuccessful or patient reports new pain  Outcome: Progressing     Problem: INFECTION - ADULT  Goal: Absence or prevention of progression during hospitalization  Description: INTERVENTIONS:  - Assess and monitor for signs and symptoms of infection  - Monitor lab/diagnostic results  - Monitor all insertion sites, i.e. indwelling lines, tubes, and drains  - Monitor endotracheal if appropriate and nasal secretions for changes in amount and color  - [place] appropriate cooling/warming therapies per order  - Administer medications as ordered  - Instruct and encourage patient and family to use good hand hygiene  technique  - Identify and instruct in appropriate isolation precautions for identified infection/condition  Outcome: Progressing  Goal: Absence of fever/infection during neutropenic period  Description: INTERVENTIONS:  - Monitor WBC    Outcome: Progressing     Problem: DISCHARGE PLANNING  Goal: Discharge to home or other facility with appropriate resources  Description: INTERVENTIONS:  - Identify barriers to discharge w/patient and caregiver  - Arrange for needed discharge resources and transportation as appropriate  - Identify discharge learning needs (meds, wound care, etc.)  - Arrange for interpretive services to assist at discharge as needed  - Refer to Case Management Department for coordinating discharge planning if the patient needs post-hospital services based on physician/advanced practitioner order or complex needs related to functional status, cognitive ability, or social support system  Outcome: Progressing     Problem: Knowledge Deficit  Goal: Patient/family/caregiver demonstrates understanding of disease process, treatment plan, medications, and discharge instructions  Description: Complete learning assessment and assess knowledge base.  Interventions:  - Provide teaching at level of understanding  - Provide teaching via preferred learning methods  Outcome: Progressing

## 2024-05-19 NOTE — PROGRESS NOTES
"[facility]  Progress Note  Name: Jackie Cunningham I  MRN: [id number]  Unit/Bed#: -01 I Date of Admission: 5/16/2024   Date of Service: 5/19/2024 I Hospital Day: 3    Assessment & Plan   * Cellulitis and abscess of foot  Assessment & Plan  Developed mild pain on 5/13 with development of erythema and swelling on 5/15  Was seen in the ED on 5/15 and offered admission, however patient refused so abscess was drained and she was placed on clindamycin, return 5/16 due to worsened pain  CT lower extremity 5/15: \"Shallow soft tissue ulceration between the first and second digits with 1.0 cm rim-enhancing collection in the first webspace. \"  Wound culture obtained 5/15 in the ED, positive for Streptococcus   ESR 69, .9    Previously on IV vancomycin and cefepime, simplified to IV vancomycin-day #4  Status post I&D first interspace by podiatry on 5/17  Appreciate podiatry assistance    Hypertrophic cardiomyopathy (HCC)  Assessment & Plan  Follows with [facility] cardiology  Examines euvolemic on admit  Maintained on Lasix 20 Mg daily    Type 2 diabetes mellitus ([facility])  Assessment & Plan  Lab Results   Component Value Date    HGBA1C 13.5 (H) 05/16/2024       Recent Labs     05/18/24  1139 05/18/24  1604 05/18/24  2047 05/19/24  0723   POCGLU 330* 272* 163* 162*       Blood Sugar Average: Last 72 hrs:  (P) 243.3Home regimen: Ozempic and SSI at home    Lantus 20 units ( increased from prior 15 units ) daily at a.m.  Insulin lispro 5 units ( increased from prior 3 units ) 3 times daily AC  Additional coverage with insulin on sliding scale  Diabetic diet, frequent Accu-Cheks and hypoglycemia protocol      PAF (paroxysmal atrial fibrillation) (Former[facility])  Assessment & Plan  Status post ablation 12/2023  Home regimen: Amiodarone 200 Mg daily, atenolol 25 Mg daily, and anticoagulation with Eliquis 5 Mg twice daily  Continue home amiodarone and atenolol-hold Eliquis in case of need for OR procedure  Rate " controlled and in normal sinus rhythm on admit    Depression  Assessment & Plan  Continue home Zoloft 25 Mg daily  Mood stable, still grieving recent demise of her spouse     Primary hypertension  Assessment & Plan  Home regimen: Atenolol 25 Mg daily, Lasix 20 Mg daily, losartan 100 Mg daily  Continue home regimen  /77 (BP Location: Left arm)   Pulse 71   Temp 98.2 °F (36.8 °C) (Oral)   Resp 18   Wt 79.3 kg (174 lb 14.4 oz)   SpO2 95%   BMI 31.99 kg/m²   Vitals as per routine               VTE Pharmacologic Prophylaxis: VTE Score: 5 Moderate Risk (Score 3-4) - Pharmacological DVT Prophylaxis Ordered: apixaban (Eliquis).    Mobility:   Basic Mobility Inpatient Raw Score: 24  JH-HLM Goal: 8: Walk 250 feet or more  JH-HLM Achieved: 8: Walk 250 feet ot more  JH-HLM Goal achieved. Continue to encourage appropriate mobility.    Patient Centered Rounds: I performed bedside rounds with nursing staff today.   Discussions with Specialists or Other Care Team Provider: yes    Education and Discussions with Family / Patient:  yes.     Total Time Spent on Date of Encounter in care of patient: 39 mins. This time was spent on one or more of the following: performing physical exam; counseling and coordination of care; obtaining or reviewing history; documenting in the medical record; reviewing/ordering tests, medications or procedures; communicating with other healthcare professionals and discussing with patient's family/caregivers.    Current Length of Stay: 3 day(s)  Current Patient Status: Inpatient   Certification Statement: The patient will continue to require additional inpatient hospital stay due to pending evaluation  Discharge Plan: Anticipate discharge in 24-48 hrs to home.    Code Status: Level 1 - Full Code    Subjective:   Seen and examined at bedside  Stable  No complaints      Objective:     Vitals:   Temp (24hrs), Av.3 °F (36.3 °C), Min:96.8 °F (36 °C), Max:98.2 °F (36.8 °C)    Temp:  [96.8 °F (36  °C)-98.2 °F (36.8 °C)] 98.2 °F (36.8 °C)  HR:  [67-71] 71  Resp:  [18] 18  BP: (127-144)/(67-79) 130/77  SpO2:  [94 %-97 %] 95 %  Body mass index is 31.99 kg/m².     Input and Output Summary (last 24 hours):     Intake/Output Summary (Last 24 hours) at 5/19/2024 1045  Last data filed at 5/19/2024 0401  Gross per 24 hour   Intake 250 ml   Output 350 ml   Net -100 ml       Physical Exam:   Physical Exam  Vitals reviewed.   Constitutional:       Appearance: She is obese.   HENT:      Head: Atraumatic.      Mouth/Throat:      Mouth: Mucous membranes are dry.   Eyes:      General: No scleral icterus.  Cardiovascular:      Rate and Rhythm: Normal rate.      Heart sounds: Normal heart sounds.   Pulmonary:      Effort: No respiratory distress.   Abdominal:      General: Bowel sounds are normal.   Musculoskeletal:      Right lower leg: No edema.      Left lower leg: No edema.   Skin:     Capillary Refill: Capillary refill takes less than 2 seconds.      Findings: Lesion present.   Neurological:      Mental Status: She is alert and oriented to person, place, and time.   Psychiatric:         Mood and Affect: Mood normal.          Additional Data:     Labs:  Results from last 7 days   Lab Units 05/19/24  0446   WBC Thousand/uL 4.96   HEMOGLOBIN g/dL 11.8   HEMATOCRIT % 35.5   PLATELETS Thousands/uL 207   SEGS PCT % 55   LYMPHO PCT % 31   MONO PCT % 10   EOS PCT % 2     Results from last 7 days   Lab Units 05/19/24  0446 05/16/24  1836 05/15/24  1627   SODIUM mmol/L 136   < > 131*   POTASSIUM mmol/L 3.8   < > 4.0   CHLORIDE mmol/L 101   < > 92*   CO2 mmol/L 29   < > 30   BUN mg/dL 13   < > 13   CREATININE mg/dL 0.80   < > 1.00   ANION GAP mmol/L 6   < > 9   CALCIUM mg/dL 8.8   < > 9.2   ALBUMIN g/dL  --   --  4.0   TOTAL BILIRUBIN mg/dL  --   --  1.62*   ALK PHOS U/L  --   --  97   ALT U/L  --   --  14   AST U/L  --   --  12*   GLUCOSE RANDOM mg/dL 148*   < > 454*    < > = values in this interval not displayed.     Results from  last 7 days   Lab Units 05/16/24  1836   INR  1.10     Results from last 7 days   Lab Units 05/19/24  0723 05/18/24  2047 05/18/24  1604 05/18/24  1139 05/18/24  0559 05/17/24  2231 05/17/24  1615 05/17/24  0608 05/17/24  0006 05/16/24  2112 05/15/24  1931   POC GLUCOSE mg/dl 162* 163* 272* 330* 183* 263* 208* 187* 379* 286* 304*     Results from last 7 days   Lab Units 05/16/24  1836   HEMOGLOBIN A1C % 13.5*     Results from last 7 days   Lab Units 05/16/24  1836 05/15/24  1627   LACTIC ACID mmol/L 1.5 2.0   PROCALCITONIN ng/ml <0.05 0.06       Lines/Drains:  Invasive Devices       Peripheral Intravenous Line  Duration             Peripheral IV 05/16/24 Distal;Left;Ventral (anterior) Wrist 2 days    Peripheral IV 05/16/24 Right;Ventral (anterior) Forearm 2 days                          Imaging: No pertinent imaging reviewed.    Recent Cultures (last 7 days):   Results from last 7 days   Lab Units 05/16/24  1836 05/15/24  2026 05/15/24  1634 05/15/24  1627   BLOOD CULTURE  No Growth at 48 hrs.  No Growth at 48 hrs.  --  No Growth at 72 hrs. No Growth at 72 hrs.   GRAM STAIN RESULT   --  3+ Gram positive cocci in pairs and chains*  Rare Polys*  --   --    WOUND CULTURE   --  4+ Growth of Streptococcus anginosus*  Few Colonies of  --   --        Last 24 Hours Medication List:   Current Facility-Administered Medications   Medication Dose Route Frequency Provider Last Rate    acetaminophen  650 mg Oral Q6H PRN Elva Gallego PA-C      aluminum-magnesium hydroxide-simethicone  30 mL Oral Q6H PRN Elva Gallego PA-C      amiodarone  200 mg Oral Daily Elva Gallego PA-C      apixaban  5 mg Oral BID Maryellen Funez MD      atenolol  25 mg Oral Daily Elva Gallego PA-C      cholecalciferol  1,000 Units Oral Daily Elva Gallego PA-C      famotidine  40 mg Oral Every Other Day Elva Gallego PA-C      furosemide  20 mg Oral Daily Maryellen Funez MD      gabapentin  100 mg Oral Daily Elva Gallego,  RD      HYDROmorphone  0.2 mg Intravenous Q6H PRN Elva Gallego PA-C      [START ON 5/20/2024] insulin glargine  20 Units Subcutaneous QAM Maryellen Funez MD      insulin lispro  1-5 Units Subcutaneous TID AC Maryellen Funez MD      insulin lispro  1-5 Units Subcutaneous HS Maryellen Funez MD      insulin lispro  4 Units Subcutaneous TID With Meals Maryellen Funez MD      levothyroxine  100 mcg Oral Early Morning Elva Gallego PA-C      losartan  100 mg Oral Daily Maryellen Funez MD      ondansetron  4 mg Intravenous Q6H PRN Elva Gallego PA-C      oxyCODONE  2.5 mg Oral Q6H PRN Elva Gallego PA-C      Or    oxyCODONE  5 mg Oral Q6H PRN Elva Gallego PA-C      pantoprazole  40 mg Oral Daily Elva Gallego PA-C      senna  1 tablet Oral HS PRN Elva Gallego PA-C      sertraline  25 mg Oral Daily Elva Gallego PA-C      vancomycin  1,500 mg Intravenous Q24H Maryellen Funez MD          Today, Patient Was Seen By: Maryellen Funez MD    **Please Note: This note may have been constructed using a voice recognition system.**

## 2024-05-19 NOTE — ASSESSMENT & PLAN NOTE
"Developed mild pain on 5/13 with development of erythema and swelling on 5/15  Was seen in the ED on 5/15 and offered admission, however patient refused so abscess was drained and she was placed on clindamycin, return 5/16 due to worsened pain  CT lower extremity 5/15: \"Shallow soft tissue ulceration between the first and second digits with 1.0 cm rim-enhancing collection in the first webspace. \"  Wound culture obtained 5/15 in the ED, positive for Streptococcus   ESR 69, .9    Previously on IV vancomycin and cefepime, simplified to IV vancomycin-day #4  Status post I&D first interspace by podiatry on 5/17  Appreciate podiatry assistance  "

## 2024-05-20 VITALS
HEIGHT: 62 IN | TEMPERATURE: 96.8 F | WEIGHT: 174.9 LBS | SYSTOLIC BLOOD PRESSURE: 139 MMHG | OXYGEN SATURATION: 100 % | RESPIRATION RATE: 17 BRPM | HEART RATE: 81 BPM | BODY MASS INDEX: 32.18 KG/M2 | DIASTOLIC BLOOD PRESSURE: 82 MMHG

## 2024-05-20 LAB
ANION GAP SERPL CALCULATED.3IONS-SCNC: 9 MMOL/L (ref 4–13)
BACTERIA BLD CULT: NORMAL
BACTERIA BLD CULT: NORMAL
BASOPHILS # BLD AUTO: 0.03 THOUSANDS/ÂΜL (ref 0–0.1)
BASOPHILS NFR BLD AUTO: 1 % (ref 0–1)
BUN SERPL-MCNC: 17 MG/DL (ref 5–25)
CALCIUM SERPL-MCNC: 9 MG/DL (ref 8.4–10.2)
CHLORIDE SERPL-SCNC: 100 MMOL/L (ref 96–108)
CO2 SERPL-SCNC: 27 MMOL/L (ref 21–32)
CREAT SERPL-MCNC: 0.89 MG/DL (ref 0.6–1.3)
EOSINOPHIL # BLD AUTO: 0.09 THOUSAND/ÂΜL (ref 0–0.61)
EOSINOPHIL NFR BLD AUTO: 2 % (ref 0–6)
ERYTHROCYTE [DISTWIDTH] IN BLOOD BY AUTOMATED COUNT: 11.8 % (ref 11.6–15.1)
GFR SERPL CREATININE-BSD FRML MDRD: 63 ML/MIN/1.73SQ M
GLUCOSE SERPL-MCNC: 164 MG/DL (ref 65–140)
GLUCOSE SERPL-MCNC: 185 MG/DL (ref 65–140)
GLUCOSE SERPL-MCNC: 201 MG/DL (ref 65–140)
GLUCOSE SERPL-MCNC: 205 MG/DL (ref 65–140)
HCT VFR BLD AUTO: 36.1 % (ref 34.8–46.1)
HGB BLD-MCNC: 12.1 G/DL (ref 11.5–15.4)
IMM GRANULOCYTES # BLD AUTO: 0.04 THOUSAND/UL (ref 0–0.2)
IMM GRANULOCYTES NFR BLD AUTO: 1 % (ref 0–2)
LYMPHOCYTES # BLD AUTO: 1.69 THOUSANDS/ÂΜL (ref 0.6–4.47)
LYMPHOCYTES NFR BLD AUTO: 30 % (ref 14–44)
MCH RBC QN AUTO: 30 PG (ref 26.8–34.3)
MCHC RBC AUTO-ENTMCNC: 33.5 G/DL (ref 31.4–37.4)
MCV RBC AUTO: 90 FL (ref 82–98)
MONOCYTES # BLD AUTO: 0.54 THOUSAND/ÂΜL (ref 0.17–1.22)
MONOCYTES NFR BLD AUTO: 10 % (ref 4–12)
NEUTROPHILS # BLD AUTO: 3.26 THOUSANDS/ÂΜL (ref 1.85–7.62)
NEUTS SEG NFR BLD AUTO: 56 % (ref 43–75)
NRBC BLD AUTO-RTO: 0 /100 WBCS
PLATELET # BLD AUTO: 251 THOUSANDS/UL (ref 149–390)
PMV BLD AUTO: 10.3 FL (ref 8.9–12.7)
POTASSIUM SERPL-SCNC: 3.8 MMOL/L (ref 3.5–5.3)
RBC # BLD AUTO: 4.03 MILLION/UL (ref 3.81–5.12)
SODIUM SERPL-SCNC: 136 MMOL/L (ref 135–147)
WBC # BLD AUTO: 5.65 THOUSAND/UL (ref 4.31–10.16)

## 2024-05-20 PROCEDURE — 97163 PT EVAL HIGH COMPLEX 45 MIN: CPT

## 2024-05-20 PROCEDURE — 99239 HOSP IP/OBS DSCHRG MGMT >30: CPT | Performed by: INTERNAL MEDICINE

## 2024-05-20 PROCEDURE — 80048 BASIC METABOLIC PNL TOTAL CA: CPT | Performed by: INTERNAL MEDICINE

## 2024-05-20 PROCEDURE — 82948 REAGENT STRIP/BLOOD GLUCOSE: CPT

## 2024-05-20 PROCEDURE — 97760 ORTHOTIC MGMT&TRAING 1ST ENC: CPT

## 2024-05-20 PROCEDURE — 99024 POSTOP FOLLOW-UP VISIT: CPT | Performed by: PODIATRIST

## 2024-05-20 PROCEDURE — 85025 COMPLETE CBC W/AUTO DIFF WBC: CPT | Performed by: INTERNAL MEDICINE

## 2024-05-20 RX ORDER — BLOOD SUGAR DIAGNOSTIC
STRIP MISCELLANEOUS
Qty: 200 EACH | Refills: 0 | Status: SHIPPED | OUTPATIENT
Start: 2024-05-20

## 2024-05-20 RX ORDER — INSULIN ASPART 100 [IU]/ML
5 INJECTION, SOLUTION INTRAVENOUS; SUBCUTANEOUS
Qty: 4.5 ML | Refills: 0 | Status: SHIPPED | OUTPATIENT
Start: 2024-05-20 | End: 2024-06-19

## 2024-05-20 RX ORDER — BLOOD-GLUCOSE METER
KIT MISCELLANEOUS
Qty: 1 KIT | Refills: 0 | Status: SHIPPED | OUTPATIENT
Start: 2024-05-20

## 2024-05-20 RX ORDER — CLINDAMYCIN HYDROCHLORIDE 300 MG/1
300 CAPSULE ORAL EVERY 6 HOURS
Qty: 28 CAPSULE | Refills: 0 | Status: SHIPPED | OUTPATIENT
Start: 2024-05-20 | End: 2024-05-27

## 2024-05-20 RX ORDER — INSULIN GLARGINE 100 [IU]/ML
20 INJECTION, SOLUTION SUBCUTANEOUS EVERY MORNING
Qty: 6 ML | Refills: 0 | Status: SHIPPED | OUTPATIENT
Start: 2024-05-20 | End: 2024-06-19

## 2024-05-20 RX ORDER — LANCETS 33 GAUGE
EACH MISCELLANEOUS
Qty: 200 EACH | Refills: 0 | Status: SHIPPED | OUTPATIENT
Start: 2024-05-20

## 2024-05-20 RX ORDER — PEN NEEDLE, DIABETIC 32GX 5/32"
NEEDLE, DISPOSABLE MISCELLANEOUS
Qty: 100 EACH | Refills: 0 | Status: SHIPPED | OUTPATIENT
Start: 2024-05-20

## 2024-05-20 RX ORDER — GLUCOSAMINE HCL/CHONDROITIN SU 500-400 MG
CAPSULE ORAL
Qty: 200 EACH | Refills: 0 | Status: SHIPPED | OUTPATIENT
Start: 2024-05-20

## 2024-05-20 RX ADMIN — INSULIN LISPRO 1 UNITS: 100 INJECTION, SOLUTION INTRAVENOUS; SUBCUTANEOUS at 18:37

## 2024-05-20 RX ADMIN — INSULIN LISPRO 1 UNITS: 100 INJECTION, SOLUTION INTRAVENOUS; SUBCUTANEOUS at 11:59

## 2024-05-20 RX ADMIN — LEVOTHYROXINE SODIUM 100 MCG: 25 TABLET ORAL at 05:19

## 2024-05-20 RX ADMIN — GABAPENTIN 100 MG: 100 CAPSULE ORAL at 09:08

## 2024-05-20 RX ADMIN — ATENOLOL 25 MG: 50 TABLET ORAL at 09:08

## 2024-05-20 RX ADMIN — VANCOMYCIN HYDROCHLORIDE 1500 MG: 5 INJECTION, POWDER, LYOPHILIZED, FOR SOLUTION INTRAVENOUS at 09:07

## 2024-05-20 RX ADMIN — INSULIN LISPRO 1 UNITS: 100 INJECTION, SOLUTION INTRAVENOUS; SUBCUTANEOUS at 07:42

## 2024-05-20 RX ADMIN — APIXABAN 5 MG: 5 TABLET, FILM COATED ORAL at 18:37

## 2024-05-20 RX ADMIN — INSULIN GLARGINE 20 UNITS: 100 INJECTION, SOLUTION SUBCUTANEOUS at 09:23

## 2024-05-20 RX ADMIN — INSULIN LISPRO 4 UNITS: 100 INJECTION, SOLUTION INTRAVENOUS; SUBCUTANEOUS at 18:37

## 2024-05-20 RX ADMIN — FUROSEMIDE 20 MG: 20 TABLET ORAL at 09:08

## 2024-05-20 RX ADMIN — LOSARTAN POTASSIUM 100 MG: 50 TABLET, FILM COATED ORAL at 09:08

## 2024-05-20 RX ADMIN — INSULIN LISPRO 4 UNITS: 100 INJECTION, SOLUTION INTRAVENOUS; SUBCUTANEOUS at 07:44

## 2024-05-20 RX ADMIN — AMIODARONE HYDROCHLORIDE 200 MG: 200 TABLET ORAL at 09:09

## 2024-05-20 RX ADMIN — APIXABAN 5 MG: 5 TABLET, FILM COATED ORAL at 09:08

## 2024-05-20 RX ADMIN — Medication 1000 UNITS: at 09:09

## 2024-05-20 RX ADMIN — PANTOPRAZOLE SODIUM 40 MG: 40 TABLET, DELAYED RELEASE ORAL at 09:09

## 2024-05-20 RX ADMIN — INSULIN LISPRO 4 UNITS: 100 INJECTION, SOLUTION INTRAVENOUS; SUBCUTANEOUS at 12:00

## 2024-05-20 RX ADMIN — SERTRALINE HYDROCHLORIDE 25 MG: 50 TABLET ORAL at 09:08

## 2024-05-20 RX ADMIN — FAMOTIDINE 40 MG: 20 TABLET, FILM COATED ORAL at 09:08

## 2024-05-20 NOTE — PLAN OF CARE
Problem: INFECTION - ADULT  Goal: Absence or prevention of progression during hospitalization  Description: INTERVENTIONS:  - Assess and monitor for signs and symptoms of infection  - Monitor lab/diagnostic results  - Monitor all insertion sites, i.e. indwelling lines, tubes, and drains  - Monitor endotracheal if appropriate and nasal secretions for changes in amount and color  - [place] appropriate cooling/warming therapies per order  - Administer medications as ordered  - Instruct and encourage patient and family to use good hand hygiene technique  - Identify and instruct in appropriate isolation precautions for identified infection/condition  Outcome: Progressing  Goal: Absence of fever/infection during neutropenic period  Description: INTERVENTIONS:  - Monitor WBC    Outcome: Progressing     Problem: PAIN - ADULT  Goal: Verbalizes/displays adequate comfort level or baseline comfort level  Description: Interventions:  - Encourage patient to monitor pain and request assistance  - Assess pain using appropriate pain scale  - Administer analgesics based on type and severity of pain and evaluate response  - Implement non-pharmacological measures as appropriate and evaluate response  - Consider cultural and social influences on pain and pain management  - Notify physician/advanced practitioner if interventions unsuccessful or patient reports new pain  Outcome: Progressing

## 2024-05-20 NOTE — PLAN OF CARE
Problem: Potential for Falls  Goal: Patient will remain free of falls  Description: INTERVENTIONS:  - Educate patient/family on patient safety including physical limitations  - Instruct patient to call for assistance with activity   - Consult OT/PT to assist with strengthening/mobility   - Keep Call bell within reach  - Keep bed low and locked with side rails adjusted as appropriate  - Keep care items and personal belongings within reach  - Initiate and maintain comfort rounds  - Make Fall Risk Sign visible to staff  - Offer Toileting every 2 Hours, in advance of need  - Initiate/Maintain 2 alarm  - Obtain necessary fall risk management equipment:    - Apply yellow socks and bracelet for high fall risk patients  - Consider moving patient to room near nurses station  Outcome: Progressing     Problem: PAIN - ADULT  Goal: Verbalizes/displays adequate comfort level or baseline comfort level  Description: Interventions:  - Encourage patient to monitor pain and request assistance  - Assess pain using appropriate pain scale  - Administer analgesics based on type and severity of pain and evaluate response  - Implement non-pharmacological measures as appropriate and evaluate response  - Consider cultural and social influences on pain and pain management  - Notify physician/advanced practitioner if interventions unsuccessful or patient reports new pain  Outcome: Progressing     Problem: INFECTION - ADULT  Goal: Absence or prevention of progression during hospitalization  Description: INTERVENTIONS:  - Assess and monitor for signs and symptoms of infection  - Monitor lab/diagnostic results  - Monitor all insertion sites, i.e. indwelling lines, tubes, and drains  - Monitor endotracheal if appropriate and nasal secretions for changes in amount and color  - [place] appropriate cooling/warming therapies per order  - Administer medications as ordered  - Instruct and encourage patient and family to use good hand hygiene  technique  - Identify and instruct in appropriate isolation precautions for identified infection/condition  Outcome: Progressing  Goal: Absence of fever/infection during neutropenic period  Description: INTERVENTIONS:  - Monitor WBC    Outcome: Progressing     Problem: DISCHARGE PLANNING  Goal: Discharge to home or other facility with appropriate resources  Description: INTERVENTIONS:  - Identify barriers to discharge w/patient and caregiver  - Arrange for needed discharge resources and transportation as appropriate  - Identify discharge learning needs (meds, wound care, etc.)  - Arrange for interpretive services to assist at discharge as needed  - Refer to Case Management Department for coordinating discharge planning if the patient needs post-hospital services based on physician/advanced practitioner order or complex needs related to functional status, cognitive ability, or social support system  Outcome: Progressing     Problem: Knowledge Deficit  Goal: Patient/family/caregiver demonstrates understanding of disease process, treatment plan, medications, and discharge instructions  Description: Complete learning assessment and assess knowledge base.  Interventions:  - Provide teaching at level of understanding  - Provide teaching via preferred learning methods  Outcome: Progressing

## 2024-05-20 NOTE — CASE MANAGEMENT
Case Management Discharge Planning Note    Patient name Jackie Sexton /-01 MRN [id number]  : 1945 Date 2024       Current Admission Date: 2024  Current Admission Diagnosis:Cellulitis and abscess of foot   Patient Active Problem List    Diagnosis Date Noted Date Diagnosed    Hyperglycemia 05/15/2024     Cellulitis and abscess of foot 05/15/2024     Primary hypertension 05/15/2024     Depression 05/15/2024     Hypercoagulable state (HCC) 2024     Hypertrophic cardiomyopathy (HCC) 10/26/2023     PAF (paroxysmal atrial fibrillation) (HCC) 10/02/2023     Type 2 diabetes mellitus ([facility]) 2014       LOS (days): 4  Geometric Mean LOS (GMLOS) (days): 3.2  Days to GMLOS:-0.5     OBJECTIVE:  Risk of Unplanned Readmission Score: 12.3         Current admission status: Inpatient   Preferred Pharmacy:   [id number] - [place] - [address] [place] [address]  [place] [id number]  Phone: [phone] Fax: [phone]    Primary Care Provider: Mei Rush MD    Primary Insurance: MEDICARE  Secondary Insurance: [facility]    DISCHARGE DETAILS:           Therapy recommended a walker for pt. Pt already owns a RW and has no other rehab needs at discharge.                                                                             IMM Given (Date):: 24 (1006)  IMM Given to:: Patient

## 2024-05-20 NOTE — PROGRESS NOTES
Jackie Cunningham is a 78 y.o. female who is currently ordered Vancomycin IV with management by the Pharmacy Consult service.  Relevant clinical data and objective / subjective history reviewed.  Vancomycin Assessment:  Indication and Goal AUC/Trough: Soft tissue (goal -600, trough >10), -600, trough >10  Clinical Status: stable  Micro:     Renal Function:  SCr: 0.89 mg/dL  CrCl: 52.5 mL/min  Renal replacement: Not on dialysis  Days of Therapy: 5  Current Dose: 1500mg every 24 hours  Vancomycin Plan:  New Dosing: No change  Estimated AUC: 525 mcg*hr/mL  Estimated Trough: 13.3 mcg/mL  Next Level: 5/21/24 at 0600  Renal Function Monitoring: Daily BMP and UOP  Pharmacy will continue to follow closely for s/sx of nephrotoxicity, infusion reactions and appropriateness of therapy.  BMP and CBC will be ordered per protocol. We will continue to follow the patient’s culture results and clinical progress daily.    Trip Torre, Pharmacist, PharmD, BCPS

## 2024-05-20 NOTE — PHYSICAL THERAPY NOTE
PHYSICAL THERAPY EVALUATION NOTE    Patient Name: Jackie Cunningham  Today's Date: 2024    AGE:   78 y.o.  Mrn:   [id number]  ADMIT DX:  Cellulitis and abscess of toe of right foot [L03.031, L02.611]    Past Medical History:   Diagnosis Date    Atrial fibrillation (HCC)     Diabetes mellitus (HCC)     DM 2    GERD (gastroesophageal reflux disease)     Hypertension      Length Of Stay: 4  PHYSICAL THERAPY EVALUATION :   Patient's identity confirmed via 2 patient identifiers (full name and ) at start of session       24   PT Last Visit   PT Visit Date 24   Note Type   Note type Evaluation;Orthotic Management/Training   Type of Brace   Brace Applied Darco Wedge Shoe (Toe Unloading)  (Size S (shoe size 6.5/7))   Patient Position When Brace Applied Seated   Bracing Recommendations Recommendation (comment)  (When OOB/ambulating per podiatry)   Education   Education Provided Yes  (Pt is able to demonstrate donning/doffing the Darco wedge)   Pain Assessment   Pain Assessment Tool 0-10   Pain Score No Pain   Restrictions/Precautions   Weight Bearing Precautions Per Order Yes   RUE Weight Bearing Per Order WBAT  (WBAT in Darco Wedge shoe to heel (per podiatry note))   Braces or Orthoses Other (Comment)  (Darco wedge shoe)   Other Precautions WBS;Pain;Fall Risk   Home Living   Type of Home House   Home Layout One level;Stairs to enter without rails  (1+1 CARMELITA)   Home Equipment Walker   Additional Comments Pt reports not using AD PTA, however has been using RW lately due to her foot injury   Prior Function   Level of [place] Independent with ADLs;Independent with functional mobility   Lives With Alone   Receives 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;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, 2 sons live locally (<30 min))   IADLs Independent with driving;Independent with medication management   Vocational Retired   General  "  Family/Caregiver Present No   Cognition   Overall Cognitive Status WFL   Arousal/Participation Alert   Orientation Level Oriented X4   Memory Within functional limits   Following Commands Follows all commands and directions without difficulty   Comments Pt is very pleasant and agreeable to participate in PT evaluation   Subjective   Subjective \"I feel so much better today\"   RLE Assessment   RLE Assessment WFL   Strength RLE   RLE Overall Strength 5/5   LLE Assessment   LLE Assessment WFL   Strength LLE   LLE Overall Strength 5/5   Bed Mobility   Supine to Sit 7  Independent   Transfers   Sit to Stand 7  Independent   Stand to Sit 7  Independent   Ambulation/Elevation   Gait pattern Decreased foot clearance;Short stride   Gait Assistance 6  Modified independent   Assistive Device Rolling walker   Distance 250 ft   Ambulation/Elevation Additional Comments Pt declines needing to practice stairs, reviewed step negotiation w/ RW and w/ her 1+1 CARMELITA   Balance   Static Sitting Good   Static Standing Good   Ambulatory Good   Activity Tolerance   Activity Tolerance Patient tolerated treatment well   Nurse Made Aware MARY Glasgow   Assessment   Prognosis Good   Problem List Decreased strength;Decreased mobility;Pain;Orthopedic restrictions   Assessment Jackie Cunningham is a 78 y.o. Female who presents to [facility] on 5/16/2024 from home w/ c/o recent R foot wound lanced and diagnosis of cellulitis and abscess of foot. Orders for PT eval and treat received, w/ activity orders of WBAT R LE in darco wedge shoe. Pt presents w/ comorbidities of DMII, HTN, depression, hypertrophic cardiomyopathy. At baseline, pt mobilizes independently w/ no AD, and reports 3 falls in the last 6 months. Upon evaluation, pt presents w/ the following deficits: weakness, impaired balance, and pain limiting functional mobility. Upon eval, pt requires independent for bed mobility, independent for transfers, and modified I for gait. Based on this PT evaluation " today, patient's discharge recommendation is for Level IV. Given the above findings from this evaluation, at this time this patient does not require skilled inpatient PT for the remainder of this admission. Will D/C patient from PT caseload, please reconsult if any changes or needs arise.   Goals   Patient Goals to go home today   Discharge Recommendation   Rehab Resource Intensity Level, PT No post-acute rehabilitation needs   Equipment Recommended Walker   Walker Package Recommended Wheeled walker   Additional Comments Recommend continued use of RW for mobility while she has the darco wedge shoe   AM-PAC Basic Mobility Inpatient   Turning in Flat Bed Without Bedrails 4   Lying on Back to Sitting on Edge of Flat Bed Without Bedrails 4   Moving Bed to Chair 4   Standing Up From Chair Using Arms 4   Walk in Room 4   Climb 3-5 Stairs With Railing 4   Basic Mobility Inpatient Raw Score 24   Basic Mobility Standardized Score 57.68   [facility] Highest Level Of Mobility   -HL Goal 8: Walk 250 feet or more   -HLM Achieved 8: Walk 250 feet ot more         The patient's AM-[facility] Basic Mobility Inpatient Short Form Raw Score is 24, Standardized Score is 57.68. A standardized score greater than 38.32 (raw score of 16) suggests the patient may benefit from discharge to home which may not coincide with above PT recommendations. However please refer to therapist recommendation for discharge planning given other factors that may influence destination.    Given the above findings from this evaluation, at this time this patient does not require skilled inpatient PT for the remainder of this admission. Will D/C patient from PT caseload, please reconsult if any changes or needs arise.      Siobhan Jacob PT, DPT

## 2024-05-20 NOTE — PROGRESS NOTES
"Progress Note - Podiatry  Jackie Cunningham 78 y.o. female MRN: [id number]  Unit/Bed#: -[place] Encounter: [id number]    Assessment/Plan:  Abscess RFT 1st interspace (Deep)  S/P incision and drainage at bedside on 5/17/2024  Okay for discharge to home on p.o. antibiotics Augmentin 875 twice daily  Wound packing removed and wound irrigated with 50 cc NSS  Silver alginate, gauze, Abhishek bandage applied right foot  Wound culture from 5/15/2024 grew 4+ growth Streptococcus anginosus  Patient instructed on appropriate wound care and will follow-up within 1 to 2 weeks to the wound care center in [place].  Patient has a caretaker who will assist in managing at home.       Hypertrophic cardiomyopathy, type 2 diabetes, A-fib, depression, hypertension, chronically anticoagulated on Eliquis, implanted loop recorder  Managed per internal medicine  Encouraged better diabetes management in the future to help prevent complications.    Subjective/Objective   Chief Complaint:   Chief Complaint   Patient presents with    Wound Check     Pt to er with reports that she was in the er yesterday afternoon and had a wound lanced between her toes. Was told that she should get admitted to watch for infection and she denied. States that her foot is now having increased pain.        Subjective: 78-year-old female resting comfortably in bed reports no pain or discomfort from her right foot.  Reports within 24 hours of the incision and drainage procedure her foot felt much better.  Denies any new pain/discomfort.  Denies any shortness of breath.    Blood pressure 124/69, pulse 74, temperature (!) 96.8 °F (36 °C), temperature source Temporal, resp. rate 17, height 5' 2\" (1.575 m), weight 79.3 kg (174 lb 14.4 oz), SpO2 96%.,Body mass index is 31.99 kg/m².    Invasive Devices       Peripheral Intravenous Line  Duration             Peripheral IV 05/19/24 Proximal;Right;Ventral (anterior) Forearm 1 day                    Physical Exam:   General " appearance: alert, cooperative and no distress  Neuro/Vascular: Normal strength  Sensation and reflexes: Grossly intact  Pulses: Right: DP 1/4, PT 1/4, Left: DP 1/4, PT 1/4  Capillary Filling: 3 Sec, Edema: +1 right matthew  Extremity: Surgically created wound status post incision and drainage first interspace right foot appears healthy with no active necrosis.  Wound bed appears to be granulating well.  Scant serosanguineous drainage.  Resolution of edema erythema and calor of the forefoot.              Labs and other studies:   CBC w/diff  Results from last 7 days   Lab Units 05/20/24  0300   WBC Thousand/uL 5.65   HEMOGLOBIN g/dL 12.1   HEMATOCRIT % 36.1   PLATELETS Thousands/uL 251   SEGS PCT % 56   LYMPHO PCT % 30   MONO PCT % 10   EOS PCT % 2     BMP  Results from last 7 days   Lab Units 05/20/24  0300   POTASSIUM mmol/L 3.8   CHLORIDE mmol/L 100   CO2 mmol/L 27   BUN mg/dL 17   CREATININE mg/dL 0.89   CALCIUM mg/dL 9.0     CMP  Results from last 7 days   Lab Units 05/20/24  0300 05/16/24  1836 05/15/24  1627   POTASSIUM mmol/L 3.8   < > 4.0   CHLORIDE mmol/L 100   < > 92*   CO2 mmol/L 27   < > 30   BUN mg/dL 17   < > 13   CREATININE mg/dL 0.89   < > 1.00   CALCIUM mg/dL 9.0   < > 9.2   ALK PHOS U/L  --   --  97   ALT U/L  --   --  14   AST U/L  --   --  12*    < > = values in this interval not displayed.       @Culture@  Lab Results   Component Value Date    BLOODCX No Growth at 72 hrs. 05/16/2024    BLOODCX No Growth at 72 hrs. 05/16/2024    BLOODCX No Growth After 4 Days. 05/15/2024    BLOODCX No Growth After 4 Days. 05/15/2024     Lab Results   Component Value Date    WOUNDCULT 4+ Growth of Streptococcus anginosus (A) 05/15/2024    WOUNDCULT Few Colonies of 05/15/2024         Current Facility-Administered Medications:     acetaminophen (TYLENOL) tablet 650 mg, 650 mg, Oral, Q6H PRN, Elva Gallego PA-C, 650 mg at 05/18/24 1349    aluminum-magnesium hydroxide-simethicone (MAALOX) oral suspension 30 mL, 30  mL, Oral, Q6H PRN, Elva Gallego PA-C    amiodarone tablet 200 mg, 200 mg, Oral, Daily, Elva Gallego PA-C, 200 mg at 05/20/24 0909    apixaban (ELIQUIS) tablet 5 mg, 5 mg, Oral, BID, Maryellen Funez MD, 5 mg at 05/20/24 0908    atenolol (TENORMIN) tablet 25 mg, 25 mg, Oral, Daily, Elva Gallego PA-C, 25 mg at 05/20/24 0908    Cholecalciferol (VITAMIN D3) tablet 1,000 Units, 1,000 Units, Oral, Daily, Elva Gallego PA-C, 1,000 Units at 05/20/24 0909    famotidine (PEPCID) tablet 40 mg, 40 mg, Oral, Every Other Day, Elva Gallego PA-C, 40 mg at 05/20/24 0908    furosemide (LASIX) tablet 20 mg, 20 mg, Oral, Daily, Maryellen Funez MD, 20 mg at 05/20/24 0908    gabapentin (NEURONTIN) capsule 100 mg, 100 mg, Oral, Daily, Elva Gallego PA-C, 100 mg at 05/20/24 0908    HYDROmorphone HCl (DILAUDID) injection 0.2 mg, 0.2 mg, Intravenous, Q6H PRN, Elva Gallego PA-C, 0.2 mg at 05/17/24 1616    insulin glargine (LANTUS) subcutaneous injection 20 Units 0.2 mL, 20 Units, Subcutaneous, QAM, Maryellen Funez MD, 20 Units at 05/20/24 0923    insulin lispro (HumALOG/ADMELOG) 100 units/mL subcutaneous injection 1-5 Units, 1-5 Units, Subcutaneous, TID AC, 1 Units at 05/20/24 1159 **AND** Fingerstick Glucose (POCT), , , TID AC, Maryellen Funez MD    insulin lispro (HumALOG/ADMELOG) 100 units/mL subcutaneous injection 1-5 Units, 1-5 Units, Subcutaneous, HS, Maryellen Funez MD, 3 Units at 05/19/24 2158    insulin lispro (HumALOG/ADMELOG) 100 units/mL subcutaneous injection 4 Units, 4 Units, Subcutaneous, TID With Meals, Maryellen Funez MD, 4 Units at 05/20/24 1200    levothyroxine tablet 100 mcg, 100 mcg, Oral, Early Morning, Elva Gallego PA-C, 100 mcg at 05/20/24 0519    losartan (COZAAR) tablet 100 mg, 100 mg, Oral, Daily, Maryellen Funez MD, 100 mg at 05/20/24 0908    ondansetron (ZOFRAN) injection 4 mg, 4 mg, Intravenous, Q6H PRN, Elva Gallego PA-C    oxyCODONE (ROXICODONE) split tablet 2.5 mg, 2.5 mg, Oral, Q6H PRN,  2.5 mg at 05/17/24 1429 **OR** oxyCODONE (ROXICODONE) IR tablet 5 mg, 5 mg, Oral, Q6H PRN, Elva Gallego, PA-C, 5 mg at 05/17/24 2229    pantoprazole (PROTONIX) EC tablet 40 mg, 40 mg, Oral, Daily, Elva Beyers, PA-C, 40 mg at 05/20/24 0909    senna (SENOKOT) tablet 8.6 mg, 1 tablet, Oral, HS PRN, Elva Beyers, PA-C    sertraline (ZOLOFT) tablet 25 mg, 25 mg, Oral, Daily, Elva SHAW Gallego, PA-C, 25 mg at 05/20/24 0908    vancomycin (VANCOCIN) 1500 mg in sodium chloride 0.9% 250 mL IVPB, 1,500 mg, Intravenous, Q24H, Maryellen Funez MD, 1,500 mg at 05/20/24 0907    Imaging: I have personally reviewed pertinent films in PACS  EKG, Pathology, and Other Studies: I have personally reviewed pertinent reports.  VTE Pharmacologic Prophylaxis: Eliquis  VTE Mechanical Prophylaxis: sequential compression device    Zack Lam DPM

## 2024-05-20 NOTE — DISCHARGE INSTR - AVS FIRST PAGE
Wound care instructions:  Irrigate wound with 20 cc of saline solution, pat dry with 4 x 4 gauze  Placed small piece of silver alginate dressing into the wound, then cover with 4 x 4 gauze and wrap with Abhishek.  Follow-up within 1 to 2 weeks at the wound care center with Dr. Lam or Dr. Tenorio.  Call immediately if any signs of current infection are noted.      Please take insulin as was ordered. Take extra if your blood sugar is high as below:   Blood Glucose 150 - 189: 1 Unit of Insulin  Blood Glucose 190 - 229: 2 Units of Insulin  Blood Glucose 230 - 269: 3 Units of Insulin  Blood Glucose 270 - 309: 4 Units of Insulin  Blood Glucose 310 - 349: 5 Units of Insulin  Blood Glucose greater than or equal to 350: 6 Units of Insulin    Please check you blood sugar 4 time a day before meals and log.  Call your pcp if blood sugar is low or high   Please see an endocrinologist sooner

## 2024-05-20 NOTE — CASE MANAGEMENT
Case Management Discharge Planning Note    Patient name Jackie Sexton /-01 MRN [id number]  : 1945 Date 2024       Current Admission Date: 2024  Current Admission Diagnosis:Cellulitis and abscess of foot   Patient Active Problem List    Diagnosis Date Noted Date Diagnosed    Hyperglycemia 05/15/2024     Cellulitis and abscess of foot 05/15/2024     Primary hypertension 05/15/2024     Depression 05/15/2024     Hypercoagulable state (HCC) 2024     Hypertrophic cardiomyopathy (HCC) 10/26/2023     PAF (paroxysmal atrial fibrillation) ([facility]) 10/02/2023     Type 2 diabetes mellitus ([facility]) 2014       LOS (days): 4  Geometric Mean LOS (GMLOS) (days): 3.2  Days to GMLOS:-0.5     OBJECTIVE:  Risk of Unplanned Readmission Score: 12.3         Current admission status: Inpatient   Preferred Pharmacy:   [id number] - [place] - [address]  [place] [address]  Phone: [phone] Fax: [phone]    Primary Care Provider: Mei Rush MD    Primary Insurance: MEDICARE  Secondary Insurance: [facility]    DISCHARGE DETAILS:        IMM reviewed with patient, patient agrees with discharge determination.      IMM Given (Date):: 24 (1006)  IMM Given to:: Patient

## 2024-05-20 NOTE — PHYSICAL THERAPY NOTE
Jackie Cunningham is a 78 y.o. Female who presents to [facility] on 5/16/2024 from home w/ c/o recent R foot wound lanced and diagnosis of cellulitis and abscess of foot. Orders for PT eval and treat received, w/ activity orders of WBAT R LE in darco wedge shoe. Pt presents w/ comorbidities of ***. At baseline, pt mobilizes *** w/ ***, and reports *** falls in the last 6 months. Upon evaluation, pt presents w/ the following deficits: {abwphysicalexam:67669}. Upon eval, pt requires *** for bed mobility, *** for transfers, and *** for gait. Based on this PT evaluation today, [facility] Given the above findings from this evaluation, at this time this patient does not require skilled inpatient PT for the remainder of this admission. Will D/C patient from PT caseload, please reconsult if any changes or needs arise.

## 2024-05-21 NOTE — ASSESSMENT & PLAN NOTE
"Developed mild pain on 5/13 with development of erythema and swelling on 5/15  Was seen in the ED on 5/15 and offered admission, however patient refused so abscess was drained and she was placed on clindamycin, return 5/16 due to worsened pain  CT lower extremity 5/15: \"Shallow soft tissue ulceration between the first and second digits with 1.0 cm rim-enhancing collection in the first webspace. \"  Wound culture obtained 5/15 in the ED, positive for Streptococcus   ESR 69, .9  Status post I&D first interspace by podiatry on 5/17  Patient has antibiotics allergies.  She took 2 doses of clindamycin prior to this admission without side effects.  Will discharge patient on clindamycin for another 7 days to complete treatment.  Follow-up with wound care in 1 week  "

## 2024-05-21 NOTE — ASSESSMENT & PLAN NOTE
"Home regimen: Atenolol 25 Mg daily, Lasix 20 Mg daily, losartan 100 Mg daily  Continue home regimen  /82 (BP Location: Left arm)   Pulse 81   Temp (!) 96.8 °F (36 °C) (Temporal)   Resp 17   Ht 5' 2\" (1.575 m)   Wt 79.3 kg (174 lb 14.4 oz)   SpO2 100%   BMI 31.99 kg/m²   Blood pressure stable  "

## 2024-05-21 NOTE — ASSESSMENT & PLAN NOTE
Lab Results   Component Value Date    HGBA1C 13.5 (H) 05/16/2024       Recent Labs     05/19/24  2136 05/20/24  0724 05/20/24  1153 05/20/24  1644   POCGLU 282* 164* 185* 201*         Blood Sugar Average: Last 72 hrs:  (P) [id number] regimen: Ozempic and SSI at home    Was discharged on Lantus 20 units am, Insulin lispro 5 units  3 times daily AC  Additional coverage with insulin on sliding scale.   Discussed with the patient to check blood sugar 4 times a day prior to meals.  If blood sugar still uncontrolled to call family doctor.  Patient has an appointment with endocrinology this summer.  Advised her if she can get appointment sooner.  Diabetic diet

## 2024-05-21 NOTE — DISCHARGE SUMMARY
"[facility]  Discharge- Jackie Cunningham 1945, 78 y.o. female MRN: [id number]  Unit/Bed#: MS [facility]01 Encounter: [id number]  Primary Care Provider: Mei Rush MD   Date and time admitted to hospital: 5/16/2024  6:13 PM    * Cellulitis and abscess of foot  Assessment & Plan  Developed mild pain on 5/13 with development of erythema and swelling on 5/15  Was seen in the ED on 5/15 and offered admission, however patient refused so abscess was drained and she was placed on clindamycin, return 5/16 due to worsened pain  CT lower extremity 5/15: \"Shallow soft tissue ulceration between the first and second digits with 1.0 cm rim-enhancing collection in the first webspace. \"  Wound culture obtained 5/15 in the ED, positive for Streptococcus   ESR 69, .9  Status post I&D first interspace by podiatry on 5/17  Patient has antibiotics allergies.  She took 2 doses of clindamycin prior to this admission without side effects.  Will discharge patient on clindamycin for another 7 days to complete treatment.  Follow-up with wound care in 1 week    Hypertrophic cardiomyopathy (HCC)  Assessment & Plan  Follows with [facility] cardiology  Examines euvolemic on admit  Maintained on Lasix 20 Mg daily    Type 2 diabetes mellitus (HCC)  Assessment & Plan  Lab Results   Component Value Date    HGBA1C 13.5 (H) 05/16/2024       Recent Labs     05/19/24  2136 05/20/24  0724 05/20/24  1153 05/20/24  1644   POCGLU 282* 164* 185* 201*         Blood Sugar Average: Last 72 hrs:  (P) [id number] regimen: Ozempic and SSI at home    Was discharged on Lantus 20 units am, Insulin lispro 5 units  3 times daily AC  Additional coverage with insulin on sliding scale.   Discussed with the patient to check blood sugar 4 times a day prior to meals.  If blood sugar still uncontrolled to call family doctor.  Patient has an appointment with endocrinology this summer.  Advised her if she can get appointment " "sooner.  Diabetic diet      PAF (paroxysmal atrial fibrillation) ([facility])  Assessment & Plan  Status post ablation 12/2023  Home regimen: Amiodarone 200 Mg daily, atenolol 25 Mg daily, and anticoagulation with Eliquis 5 Mg twice daily  Continue     Depression  Assessment & Plan  Continue home Zoloft 25 Mg daily  Mood stable, still grieving recent demise of her spouse     Primary hypertension  Assessment & Plan  Home regimen: Atenolol 25 Mg daily, Lasix 20 Mg daily, losartan 100 Mg daily  Continue home regimen  /82 (BP Location: Left arm)   Pulse 81   Temp (!) 96.8 °F (36 °C) (Temporal)   Resp 17   Ht 5' 2\" (1.575 m)   Wt 79.3 kg (174 lb 14.4 oz)   SpO2 100%   BMI 31.99 kg/m²   Blood pressure stable        Medical Problems       Resolved Problems  Date Reviewed: 5/19/2024   None       Discharging Physician / Practitioner: Justa Medina MD  PCP: Mei Rush MD  Admission Date:   Admission Orders (From admission, onward)       Ordered        05/16/24 1946  INPATIENT ADMISSION  Once                          Discharge Date: 05/20/24    Consultations During Hospital Stay:  Podiatry     Procedures Performed:   I&D    Significant Findings / Test Results:   XR foot 3+ views RIGHT  Result Date: 5/17/2024  Impression: No radiographic evidence of osteomyelitis. No soft tissue gas. No acute osseous abnormality.      Incidental Findings:   none     Test Results Pending at Discharge (will require follow up):   none     Outpatient Tests Requested:  none    Complications:  none    Reason for Admission: foot pain     Hospital Course:   Jackie Cunningham is a 78 y.o. female patient who originally presented to the hospital on 5/16/2024 due to right foot pain along with erythema and swelling.  Patient was seen in the emergency department on 5/15 and she was noted to have an abscess between the first and second digits, absence was drained and the patient was discharged on clindamycin.  She returned on 5/16 due to worsening " "pain.  Cultures obtained showed Streptococcus.  Patient was started on antibiotics IV with improvement.  Patient had I&D by podiatry on 5/17.    Patient stable for discharge.  Will continue clindamycin for another 7 days per podiatry.  She will need to follow-up with wound care outpatient in 1 to 2 weeks.    Patient also had hyperglycemia on presentation, hemoglobin A1c 13.5.  Patient was started on Lantus and standing insulin before meals.  Advised her to follow-up with PCP and endocrinology soon.      Please see above list of diagnoses and related plan for additional information.     Condition at Discharge: good    Discharge Day Visit / Exam:   Subjective:  no symptoms  Vitals: Blood Pressure: 139/82 (05/20/24 1923)  Pulse: 81 (05/20/24 1923)  Temperature: (!) 96.8 °F (36 °C) (05/20/24 1923)  Temp Source: Temporal (05/20/24 1923)  Respirations: 17 (05/20/24 0807)  Height: 5' 2\" (157.5 cm) (05/19/24 1104)  Weight - Scale: 79.3 kg (174 lb 14.4 oz) (05/16/24 2046)  SpO2: 100 % (05/20/24 1923)  Exam:   Physical Exam  Vitals and nursing note reviewed.   Constitutional:       General: She is not in acute distress.     Appearance: She is not diaphoretic.   HENT:      Head: Normocephalic.   Eyes:      General: No scleral icterus.        Right eye: No discharge.         Left eye: No discharge.   Cardiovascular:      Rate and Rhythm: Normal rate and regular rhythm.      Heart sounds: No murmur heard.  Pulmonary:      Effort: Pulmonary effort is normal. No respiratory distress.      Breath sounds: Normal breath sounds. No wheezing, rhonchi or rales.   Abdominal:      General: There is no distension.      Palpations: Abdomen is soft.      Tenderness: There is no abdominal tenderness. There is no guarding or rebound.   Musculoskeletal:      Cervical back: Normal range of motion.      Right lower leg: No edema.      Left lower leg: No edema.   Skin:     General: Skin is warm.   Neurological:      Mental Status: She is alert and " oriented to person, place, and time.   Psychiatric:         Mood and Affect: Mood normal.         Behavior: Behavior normal.         Thought Content: Thought content normal.         Judgment: Judgment normal.          Discussion with Family: Patient declined call to .     Discharge instructions/Information to patient and family:   See after visit summary for information provided to patient and family.      Provisions for Follow-Up Care:  See after visit summary for information related to follow-up care and any pertinent home health orders.      Mobility at time of Discharge:   Basic Mobility Inpatient Raw Score: 24  JH-HLM Goal: 8: Walk 250 feet or more  JH-HLM Achieved: 7: Walk 25 feet or more  HLM Goal achieved. Continue to encourage appropriate mobility.     Disposition:   Home    Planned Readmission: no     Discharge Statement:  I spent 40 minutes discharging the patient. This time was spent on the day of discharge. I had direct contact with the patient on the day of discharge. Greater than 50% of the total time was spent examining patient, answering all patient questions, arranging and discussing plan of care with patient as well as directly providing post-discharge instructions.  Additional time then spent on discharge activities.    Discharge Medications:  See after visit summary for reconciled discharge medications provided to patient and/or family.      **Please Note: This note may have been constructed using a voice recognition system**

## 2024-05-21 NOTE — ASSESSMENT & PLAN NOTE
Status post ablation 12/2023  Home regimen: Amiodarone 200 Mg daily, atenolol 25 Mg daily, and anticoagulation with Eliquis 5 Mg twice daily  Continue

## 2024-05-22 LAB
BACTERIA BLD CULT: NORMAL
BACTERIA BLD CULT: NORMAL

## 2024-05-29 ENCOUNTER — OFFICE VISIT (OUTPATIENT)
Dept: WOUND CARE | Facility: HOSPITAL | Age: 79
End: 2024-05-29
Payer: MEDICARE

## 2024-05-29 VITALS
HEART RATE: 80 BPM | DIASTOLIC BLOOD PRESSURE: 62 MMHG | SYSTOLIC BLOOD PRESSURE: 110 MMHG | WEIGHT: 174 LBS | HEIGHT: 62 IN | BODY MASS INDEX: 32.02 KG/M2 | TEMPERATURE: 99.7 F

## 2024-05-29 DIAGNOSIS — E11.621 DIABETIC ULCER OF OTHER PART OF RIGHT FOOT ASSOCIATED WITH TYPE 2 DIABETES MELLITUS, WITH FAT LAYER EXPOSED (HCC): Primary | ICD-10-CM

## 2024-05-29 DIAGNOSIS — E11.42 DIABETIC POLYNEUROPATHY ASSOCIATED WITH TYPE 2 DIABETES MELLITUS (HCC): ICD-10-CM

## 2024-05-29 DIAGNOSIS — L97.512 DIABETIC ULCER OF OTHER PART OF RIGHT FOOT ASSOCIATED WITH TYPE 2 DIABETES MELLITUS, WITH FAT LAYER EXPOSED (HCC): Primary | ICD-10-CM

## 2024-05-29 PROCEDURE — 99213 OFFICE O/P EST LOW 20 MIN: CPT | Performed by: PODIATRIST

## 2024-05-29 PROCEDURE — 97597 DBRDMT OPN WND 1ST 20 CM/<: CPT | Performed by: PODIATRIST

## 2024-05-29 NOTE — PROGRESS NOTES
Wound Procedure Treatment Anterior;Right Toe D1, great    Performed by: Marlin Herbert RN  Authorized by: Mya Tenorio DPM    Associated wounds:   Wound 05/15/24 Toe D1, great Anterior;Right  Wound cleansed with:  NSS  Applied primary dressing:  Acticoat  Applied secondary dressing:  Gauze  Dressing secured with:  Abhishek and Tape  Offloading device appllied:  Forefoot relief shoe  Comments:  Acticoat 3, gauze joselito and tape and ace wrap.

## 2024-05-29 NOTE — PROGRESS NOTES
Patient ID: Jackie Cunningham is a 78 y.o. female Date of Birth 1945       Chief Complaint   Patient presents with    New Patient Visit     Here for right foot diabetic wounds, had surgery with Dr. Lam on 5/17/2024.       Allergies:  Cephalexin, Codeine, Morphine, and Amoxicillin    Diagnosis:  1. 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 Diabetic polyneuropathy associated with type 2 diabetes mellitus (HCC)  -     Wound cleansing and dressings Anterior;Right Toe D1, great; Future  -     Wound Procedure Treatment Anterior;Right Toe D1, great     Diagnosis ICD-10-CM Associated Orders   1. Diabetic ulcer of other part of right foot associated with type 2 diabetes mellitus, with fat layer exposed ([facility])  E11.621 Wound cleansing and dressings Anterior;Right Toe D1, great    L97.512 Wound Procedure Treatment Anterior;Right Toe D1, great      2. Diabetic polyneuropathy associated with type 2 diabetes mellitus ([facility])  E11.42 Wound cleansing and dressings Anterior;Right Toe D1, great     Wound Procedure Treatment Anterior;Right Toe D1, great           Assessment & Plan:  Diabetic Barkley grade 1 ulceration first interspace right foot secondary to abscess which was drained while patient was at [facility], wound was debrided through selective tissues and dressed with Acticoat Flex 3 dry dressing, she will do the same every other day, maintain use of cast cover, do not get foot wet in shower.  She is to continue to ambulate as tolerated with surgical shoe for ADLs.  She may put a sneaker on to drive.  I personally reviewed patient's medical records, inpatient hospitalization notes, infectious disease notes, podiatry notes, CT scan, cultures, pertinent blood work.  Today I reviewed frequent elevation,  "low-sodium diet, proper protein intake, proper glycemic control, strict pressure and friction reduction, patient understands and agrees with the plan and will follow-up in 1 week.    Debridement    Universal Protocol:  Consent: Verbal consent obtained.  Risks and benefits: risks, benefits and alternatives were discussed  Consent given by: patient  Time out: Immediately prior to procedure a \"time out\" was called to verify the correct patient, procedure, equipment, support staff and site/side marked as required.  Patient understanding: patient states understanding of the procedure being performed  Patient identity confirmed: verbally with patient    Debridement Details  Performed by: physician  Debridement type: selective  Pain control: lidocaine 4%      Post-debridement measurements  Length (cm): 0.2  Width (cm): 0.3  Depth (cm): 0.3  Percent debrided: 100%  Surface Area (cm^2): 0.06  Area Debrided (cm^2): 0.06  Volume (cm^3): 0.02    Devitalized tissue debrided: biofilm, callus and slough  Instrument(s) utilized: curette  Bleeding: small  Hemostasis obtained with: pressure  Procedural pain (0-10): insensate  Post-procedural pain: insensate   Response to treatment: procedure was tolerated well         Subjective:   Jackie presents today upon discharge from [facility] for evaluation and care of right foot diabetic ulceration, she has been changing her dressings as directed, she states she is feeling well, she is taking her antibiotics, blood sugars are okay, no new complaints.          The following portions of the patient's history were reviewed and updated as appropriate:   Patient Active Problem List   Diagnosis    Hypercoagulable state (HCC)    Hyperglycemia    Cellulitis and abscess of foot    Primary hypertension    Depression    PAF (paroxysmal atrial fibrillation) (HCC)    Type 2 diabetes mellitus (HCC)    Hypertrophic cardiomyopathy (HCC)     Past Medical History:   Diagnosis Date    " Atrial fibrillation (HCC)     Diabetes mellitus (HCC)     DM 2    GERD (gastroesophageal reflux disease)     Hypertension      Past Surgical History:   Procedure Laterality Date    ABLATION OF DYSRHYTHMIC FOCUS      APPENDECTOMY      CHOLECYSTECTOMY      COLONOSCOPY      UPPER GASTROINTESTINAL ENDOSCOPY       Social History     Socioeconomic History    Marital status: Unknown     Spouse name: Not on file    Number of children: Not on file    Years of education: Not on file    Highest education level: Not on file   Occupational History    Not on file   Tobacco Use    Smoking status: Former     Types: Cigarettes    Smokeless tobacco: Never   Vaping Use    Vaping status: Never Used   Substance and Sexual Activity    Alcohol use: Never    Drug use: Not on file    Sexual activity: Not on file   Other Topics Concern    Not on file   Social History Narrative    Not on file     Social Determinants of Health     Financial Resource Strain: Not on file   Food Insecurity: No Food Insecurity (5/17/2024)    Hunger Vital Sign     Worried About Running Out of Food in the Last Year: Never true     Ran Out of Food in the Last Year: Never true   Transportation Needs: No Transportation Needs (5/17/2024)    PRAPARE - Transportation     Lack of Transportation (Medical): No     Lack of Transportation (Non-Medical): No   Physical Activity: Not on file   Stress: Not on file   Social Connections: Not on file   Intimate Partner Violence: Not on file   Housing Stability: Low Risk  (5/17/2024)    Housing Stability Vital Sign     Unable to Pay for Housing in the Last Year: No     Number of Times Moved in the Last Year: 1     Homeless in the Last Year: No        Current Outpatient Medications:     Alcohol Swabs 70 % PADS, May substitute brand based on insurance coverage. Check glucose ACHS., Disp: 200 each, Rfl: 0    amiodarone 200 mg tablet, Take 200 mg by mouth daily, Disp: , Rfl:     apixaban (ELIQUIS) 5 mg, Take 1 tablet (5 mg total) by mouth  2 (two) times a day, Disp: 60 tablet, Rfl: 0    atenolol (TENORMIN) 25 mg tablet, Take 25 mg by mouth daily, Disp: , Rfl:     Blood Glucose Monitoring Suppl (OneTouch Verio Reflect) w/Device KIT, May substitute brand based on insurance coverage. Check glucose ACHS., Disp: 1 kit, Rfl: 0    Cholecalciferol 25 MCG (1000 UT) capsule, Take 1,000 Units by mouth every 24 hours, Disp: , Rfl:     famotidine (PEPCID) 40 MG tablet, Take 40 mg by mouth every other day, Disp: , Rfl:     furosemide (LASIX) 20 mg tablet, TAKE ONE TABLET BY MOUTH EVERY DAY for 90, Disp: , Rfl:     gabapentin (NEURONTIN) 100 mg capsule, Take 1 capsule by mouth daily, Disp: , Rfl:     glucose blood (OneTouch Verio) test strip, May substitute brand based on insurance coverage. Check glucose ACHS., Disp: 200 each, Rfl: 0    insulin aspart (NovoLOG FlexPen) 100 UNIT/ML injection pen, Inject 5 Units under the skin 3 (three) times a day with meals, Disp: 4.5 mL, Rfl: 0    Insulin Glargine Solostar (Lantus SoloStar) 100 UNIT/ML SOPN, Inject 0.2 mL (20 Units total) under the skin every morning, Disp: 6 mL, Rfl: 0    Insulin Pen Needle (BD Pen Needle Hermila 2nd Gen) 32G X 4 MM MISC, For use with insulin pen. Pharmacy may dispense brand covered by insurance., Disp: 100 each, Rfl: 0    levothyroxine 25 mcg tablet, Take 25 mcg by mouth daily, Disp: , Rfl:     losartan (COZAAR) 100 MG tablet, Take 100 mg by mouth daily, Disp: , Rfl:     Meclizine HCl 25 MG CHEW, 1 tablet Orally Three times a day as needed for 30 days, Disp: , Rfl:     nitroglycerin (NITROSTAT) 0.4 mg SL tablet, 1 tab under tongue every 5 mins as directed for chest pain Sublingual, Disp: , Rfl:     OneTouch Delica Lancets 33G MISC, May substitute brand based on insurance coverage. Check glucose ACHS., Disp: 200 each, Rfl: 0    Ozempic, 1 MG/DOSE, 4 MG/3ML injection pen, , Disp: , Rfl:     pantoprazole (PROTONIX) 40 mg tablet, Take 40 mg by mouth daily, Disp: , Rfl:     polyethylene glycol  "(GLYCOLAX) 17 GM/SCOOP powder, Take 17 g by mouth daily, Disp: 255 g, Rfl: 1    semaglutide, 0.25 or 0.5 mg/dose, (Ozempic, 0.25 or 0.5 MG/DOSE,) 2 mg/1.5 mL injection pen, Inject under the skin once a week, Disp: , Rfl:     sertraline (ZOLOFT) 25 mg tablet, Take 25 mg by mouth daily, Disp: , Rfl:   Family History   Problem Relation Age of Onset    Colon cancer Neg Hx     Colon polyps Neg Hx        Review of Systems   Constitutional:  Negative for chills and fever.   HENT:  Negative for ear pain and sore throat.    Eyes:  Negative for pain and visual disturbance.   Respiratory:  Negative for cough and shortness of breath.    Cardiovascular:  Negative for chest pain and palpitations.   Gastrointestinal:  Negative for abdominal pain and vomiting.   Genitourinary:  Negative for dysuria and hematuria.   Musculoskeletal:  Negative for arthralgias and back pain.   Skin:  Positive for color change and wound. Negative for rash.   Neurological:  Positive for numbness. Negative for seizures and syncope.   Psychiatric/Behavioral: Negative.     All other systems reviewed and are negative.        Objective:  /62   Pulse 80   Temp 99.7 °F (37.6 °C)   Ht 5' 2\" (1.575 m)   Wt 78.9 kg (174 lb)   BMI 31.83 kg/m²     Physical Exam  Constitutional:       Appearance: Normal appearance. She is well-developed and normal weight.   HENT:      Head: Normocephalic and atraumatic.      Nose: Nose normal.      Mouth/Throat:      Mouth: Mucous membranes are moist.      Pharynx: Oropharynx is clear.   Eyes:      Conjunctiva/sclera: Conjunctivae normal.      Pupils: Pupils are equal, round, and reactive to light.   Cardiovascular:      Pulses:           Dorsalis pedis pulses are 2+ on the right side and 2+ on the left side.        Posterior tibial pulses are 2+ on the right side and 2+ on the left side.   Pulmonary:      Effort: Pulmonary effort is normal.   Musculoskeletal:         General: Normal range of motion.      Cervical back: " Normal range of motion.      Right lower leg: No edema.      Left lower leg: No edema.   Skin:     General: Skin is warm and dry.      Capillary Refill: Capillary refill takes less than 2 seconds.   Neurological:      General: No focal deficit present.      Mental Status: She is alert and oriented to person, place, and time. Mental status is at baseline.      Sensory: Sensory deficit present.      Coordination: Coordination abnormal.      Gait: Gait abnormal.      Deep Tendon Reflexes: Reflexes abnormal.   Psychiatric:         Mood and Affect: Mood normal.         Behavior: Behavior normal.         Thought Content: Thought content normal.         Judgment: Judgment normal.           Wound 05/15/24 Toe D1, great Anterior;Right (Active)   Wound Image Images linked 05/29/24 1327   Wound Description Epithelialization 05/29/24 1326   Wound Length (cm) 0.2 cm 05/29/24 1335   Wound Width (cm) 0.3 cm 05/29/24 1335   Wound Depth (cm) 0.3 cm 05/29/24 1335   Wound Surface Area (cm^2) 0.06 cm^2 05/29/24 1335   Wound Volume (cm^3) 0.018 cm^3 05/29/24 1335   Calculated Wound Volume (cm^3) 0.02 cm^3 05/29/24 1335   Drainage Amount Scant 05/29/24 1335   Drainage Description Serosanguineous 05/29/24 1335   Non-staged Wound Description Full thickness 05/29/24 1335   Dressing Status Intact 05/29/24 1335          Results from last 6 Months   Lab Units 05/15/24  2026   WOUND CULTURE  4+ Growth of Streptococcus anginosus*  Few Colonies of       XR foot 3+ views RIGHT    Result Date: 5/17/2024  Narrative: XR FOOT 3+ VW RIGHT INDICATION: Evaluate for osteomyelitis and free air. COMPARISON: None FINDINGS: No acute fracture or dislocation. Calcaneal spurs. Degenerative changes in the first metatarsophalangeal joint. No lytic or blastic osseous lesion. Unremarkable soft tissues. No soft tissue gas.     Impression: No radiographic evidence of osteomyelitis. No soft tissue gas. No acute osseous abnormality. Workstation performed: [id number]      XXR foot 3+ vw right    Result Date: 5/16/2024  Narrative: XR FOOT 3+ VW RIGHT INDICATION: foot pain. COMPARISON: None FINDINGS: No acute fracture or dislocation. Degenerative changes in the first metatarsophalangeal joint. No lytic or blastic osseous lesion. Unremarkable soft tissues.     Impression: No acute osseous abnormality. Degenerative changes as described. Workstation performed: [id number]       CT lower extremity w contrast right    Result Date: 5/15/2024  Narrative: CT right lower extremity with IV contrast INDICATION: wound, lymphangetic streaking, pain. COMPARISON: Same day right foot radiographs TECHNIQUE: CT examination of the above was performed.  This examination, like all CT scans performed in the [facility], was performed utilizing techniques to minimize radiation dose exposure, including the use of iterative reconstruction  and automated exposure control software.  Multiplanar 2D reformatted images were created from the source data. IV Contrast: 100 mL of iohexol (OMNIPAQUE) Rad dose  725.98 mGy-cm FINDINGS: OSSEOUS STRUCTURES:  No fracture, dislocation or destructive osseous lesion. VISUALIZED MUSCULATURE/SOFT TISSUES: Shallow soft tissue ulceration in the first webspace with 1.0 x 0.5 cm rim-enhancing collection in the first webspace. No soft tissue gas. OTHER PERTINENT FINDINGS:  None.     Impression: Shallow soft tissue ulceration between the first and second digits with 1.0 cm rim-enhancing collection in the first webspace. Workstation performed: [id number]       Wound Instructions:  Orders Placed This Encounter   Procedures    Wound cleansing and dressings Anterior;Right Toe D1, great     Right great toe wound    Wash your hands with soap and water.  Remove old dressing, discard into plastic bag and place in trash.  Cleanse the wound with saline prior to applying a clean dressing. Do not use tissue or cotton balls. Do not scrub the wound. Pat dry using gauze.  Shower  "yes with protectin    Apply acticoat 3 to the open wound.  Cover with gauze with joselito and tape  Secure with ace wrap  Change dressing every three days     Standing Status:   Future     Standing Expiration Date:   6/5/2024    Wound Procedure Treatment Anterior;Right Toe D1, great     This order was created via procedure documentation         Mya Tenorio, CLARITA, DPM, FACFAS    Portions of the record may have been created with voice recognition software. Occasional wrong word or \"sound a like\" substitutions may have occurred due to the inherent limitations of voice recognition software. Read the chart carefully and recognize, using context, where substitutions have occurred.      "

## 2024-05-29 NOTE — PATIENT INSTRUCTIONS
Orders Placed This Encounter   Procedures    Wound cleansing and dressings Anterior;Right Toe D1, great     Right great toe wound    Wash your hands with soap and water.  Remove old dressing, discard into plastic bag and place in trash.  Cleanse the wound with saline prior to applying a clean dressing. Do not use tissue or cotton balls. Do not scrub the wound. Pat dry using gauze.  Shower yes with protectin    Apply acticoat 3 to the open wound.  Cover with gauze with joselito and tape  Secure with ace wrap  Change dressing every three days     Standing Status:   Future     Standing Expiration Date:   6/5/2024

## 2024-06-05 ENCOUNTER — OFFICE VISIT (OUTPATIENT)
Dept: WOUND CARE | Facility: HOSPITAL | Age: 79
End: 2024-06-05
Payer: MEDICARE

## 2024-06-05 VITALS
TEMPERATURE: 97.2 F | HEART RATE: 84 BPM | RESPIRATION RATE: 16 BRPM | DIASTOLIC BLOOD PRESSURE: 62 MMHG | SYSTOLIC BLOOD PRESSURE: 110 MMHG

## 2024-06-05 DIAGNOSIS — L97.512 DIABETIC ULCER OF OTHER PART OF RIGHT FOOT ASSOCIATED WITH TYPE 2 DIABETES MELLITUS, WITH FAT LAYER EXPOSED (HCC): Primary | ICD-10-CM

## 2024-06-05 DIAGNOSIS — E11.42 DIABETIC POLYNEUROPATHY ASSOCIATED WITH TYPE 2 DIABETES MELLITUS (HCC): ICD-10-CM

## 2024-06-05 DIAGNOSIS — E11.621 DIABETIC ULCER OF OTHER PART OF RIGHT FOOT ASSOCIATED WITH TYPE 2 DIABETES MELLITUS, WITH FAT LAYER EXPOSED (HCC): Primary | ICD-10-CM

## 2024-06-05 PROCEDURE — 99212 OFFICE O/P EST SF 10 MIN: CPT | Performed by: PODIATRIST

## 2024-06-05 PROCEDURE — 99213 OFFICE O/P EST LOW 20 MIN: CPT | Performed by: PODIATRIST

## 2024-06-05 NOTE — PROGRESS NOTES
Wound Procedure Treatment Anterior;Right Toe D1, great    Performed by: Marlin Herbert RN  Authorized by: Mya Tenorio DPM    Associated wounds:   Wound 05/15/24 Toe D1, great Anterior;Right  Comments:  Healed today

## 2024-06-05 NOTE — PROGRESS NOTES
Patient ID: Jackie Cunningham is a 78 y.o. female Date of Birth 1945       Chief Complaint   Patient presents with    Follow Up Wound Care Visit     Right great toe wound.       Allergies:  Cephalexin, Codeine, Morphine, and Amoxicillin    Diagnosis:  1. 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 Diabetic polyneuropathy associated with type 2 diabetes mellitus (HCC)  -     Wound cleansing and dressings Anterior;Right Toe D1, great; Future  -     Wound Procedure Treatment Anterior;Right Toe D1, great  -     Ambulatory Referral to Podiatry; Future     Diagnosis ICD-10-CM Associated Orders   1. Diabetic ulcer of other part of right foot associated with type 2 diabetes mellitus, with fat layer exposed (HCC)  E11.621 Wound cleansing and dressings Anterior;Right Toe D1, great    L97.512 Wound Procedure Treatment Anterior;Right Toe D1, great      2. Diabetic polyneuropathy associated with type 2 diabetes mellitus ([facility])  E11.42 Wound cleansing and dressings Anterior;Right Toe D1, great     Wound Procedure Treatment Anterior;Right Toe D1, great     Ambulatory Referral to Podiatry           Assessment & Plan:  Diabetic Barkley grade 0 ulceration first interspace right foot, this area was secondary to abscess, it is now well-healed and epithelialized, discontinue all dressings, may get foot wet in shower, will she will moisturize foot, do not apply moisturizer between toes.  She may transition from surgical shoe to her sneaker.  Today reviewed proper diabetic footcare, shoe gear and daily foot inspection, patient would like to transfer to [facility] podiatry and follow with Dr. Lam, referral was placed.  Patient is to maintain proper glycemic control, low-sodium diet, proper protein intake, pressure and friction  reduction, she understands and agrees with the plan and will follow-up as needed.  At this time as her wound is well-healed she is discharged from [place] wound management and will transition to [facility] podiatry.        Subjective:   Jackie presents today for care of right foot diabetic ulceration secondary to abscess in her first interspace, she has been changing dressings as directed, she states her foot looks and feels great, no new complaints.          The following portions of the patient's history were reviewed and updated as appropriate:   Patient Active Problem List   Diagnosis    Hypercoagulable state (HCC)    Hyperglycemia    Cellulitis and abscess of foot    Primary hypertension    Depression    PAF (paroxysmal atrial fibrillation) (HCC)    Type 2 diabetes mellitus (HCC)    Hypertrophic cardiomyopathy (HCC)     Past Medical History:   Diagnosis Date    Atrial fibrillation (HCC)     Diabetes mellitus (HCC)     DM 2    GERD (gastroesophageal reflux disease)     Hypertension      Past Surgical History:   Procedure Laterality Date    ABLATION OF DYSRHYTHMIC FOCUS      APPENDECTOMY      CHOLECYSTECTOMY      COLONOSCOPY      UPPER GASTROINTESTINAL ENDOSCOPY       Social History     Socioeconomic History    Marital status: Unknown     Spouse name: Not on file    Number of children: Not on file    Years of education: Not on file    Highest education level: Not on file   Occupational History    Not on file   Tobacco Use    Smoking status: Former     Types: Cigarettes    Smokeless tobacco: Never   Vaping Use    Vaping status: Never Used   Substance and Sexual Activity    Alcohol use: Never    Drug use: Not on file    Sexual activity: Not on file   Other Topics Concern    Not on file   Social History Narrative    Not on file     Social Determinants of Health     Financial Resource Strain: Not on file   Food Insecurity: No Food Insecurity (5/17/2024)    Hunger Vital Sign     Worried About Running Out of Food  in the Last Year: Never true     Ran Out of Food in the Last Year: Never true   Transportation Needs: No Transportation Needs (5/17/2024)    PRAPARE - Transportation     Lack of Transportation (Medical): No     Lack of Transportation (Non-Medical): No   Physical Activity: Not on file   Stress: Not on file   Social Connections: Not on file   Intimate Partner Violence: Not on file   Housing Stability: Low Risk  (5/17/2024)    Housing Stability Vital Sign     Unable to Pay for Housing in the Last Year: No     Number of Times Moved in the Last Year: 1     Homeless in the Last Year: No        Current Outpatient Medications:     Alcohol Swabs 70 % PADS, May substitute brand based on insurance coverage. Check glucose ACHS., Disp: 200 each, Rfl: 0    amiodarone 200 mg tablet, Take 200 mg by mouth daily, Disp: , Rfl:     apixaban (ELIQUIS) 5 mg, Take 1 tablet (5 mg total) by mouth 2 (two) times a day, Disp: 60 tablet, Rfl: 0    atenolol (TENORMIN) 25 mg tablet, Take 25 mg by mouth daily, Disp: , Rfl:     Blood Glucose Monitoring Suppl (OneTouch Verio Reflect) w/Device KIT, May substitute brand based on insurance coverage. Check glucose ACHS., Disp: 1 kit, Rfl: 0    Cholecalciferol 25 MCG (1000 UT) capsule, Take 1,000 Units by mouth every 24 hours, Disp: , Rfl:     famotidine (PEPCID) 40 MG tablet, Take 40 mg by mouth every other day, Disp: , Rfl:     furosemide (LASIX) 20 mg tablet, TAKE ONE TABLET BY MOUTH EVERY DAY for 90, Disp: , Rfl:     gabapentin (NEURONTIN) 100 mg capsule, Take 1 capsule by mouth daily, Disp: , Rfl:     glucose blood (OneTouch Verio) test strip, May substitute brand based on insurance coverage. Check glucose ACHS., Disp: 200 each, Rfl: 0    insulin aspart (NovoLOG FlexPen) 100 UNIT/ML injection pen, Inject 5 Units under the skin 3 (three) times a day with meals, Disp: 4.5 mL, Rfl: 0    Insulin Glargine Solostar (Lantus SoloStar) 100 UNIT/ML SOPN, Inject 0.2 mL (20 Units total) under the skin every  morning, Disp: 6 mL, Rfl: 0    Insulin Pen Needle (BD Pen Needle Hermila 2nd Gen) 32G X 4 MM MISC, For use with insulin pen. Pharmacy may dispense brand covered by insurance., Disp: 100 each, Rfl: 0    levothyroxine 25 mcg tablet, Take 25 mcg by mouth daily, Disp: , Rfl:     losartan (COZAAR) 100 MG tablet, Take 100 mg by mouth daily, Disp: , Rfl:     Meclizine HCl 25 MG CHEW, 1 tablet Orally Three times a day as needed for 30 days, Disp: , Rfl:     nitroglycerin (NITROSTAT) 0.4 mg SL tablet, 1 tab under tongue every 5 mins as directed for chest pain Sublingual, Disp: , Rfl:     OneTouch Delica Lancets 33G MISC, May substitute brand based on insurance coverage. Check glucose ACHS., Disp: 200 each, Rfl: 0    Ozempic, 1 MG/DOSE, 4 MG/3ML injection pen, , Disp: , Rfl:     pantoprazole (PROTONIX) 40 mg tablet, Take 40 mg by mouth daily, Disp: , Rfl:     polyethylene glycol (GLYCOLAX) 17 GM/SCOOP powder, Take 17 g by mouth daily, Disp: 255 g, Rfl: 1    semaglutide, 0.25 or 0.5 mg/dose, (Ozempic, 0.25 or 0.5 MG/DOSE,) 2 mg/1.5 mL injection pen, Inject under the skin once a week, Disp: , Rfl:     sertraline (ZOLOFT) 25 mg tablet, Take 25 mg by mouth daily, Disp: , Rfl:   Family History   Problem Relation Age of Onset    Colon cancer Neg Hx     Colon polyps Neg Hx        Review of Systems   Constitutional:  Negative for chills and fever.   HENT:  Negative for ear pain and sore throat.    Eyes:  Negative for pain and visual disturbance.   Respiratory:  Negative for cough and shortness of breath.    Cardiovascular:  Negative for chest pain and palpitations.   Gastrointestinal:  Negative for abdominal pain and vomiting.   Genitourinary:  Negative for dysuria and hematuria.   Musculoskeletal:  Negative for arthralgias and back pain.   Skin:  Positive for color change and wound. Negative for rash.   Neurological:  Positive for numbness. Negative for seizures and syncope.   Psychiatric/Behavioral: Negative.     All other systems  reviewed and are negative.        Objective:  /62   Pulse 84   Temp (!) 97.2 °F (36.2 °C)   Resp 16     Physical Exam  Constitutional:       Appearance: Normal appearance. She is well-developed and normal weight.   HENT:      Head: Normocephalic and atraumatic.      Nose: Nose normal.      Mouth/Throat:      Mouth: Mucous membranes are moist.      Pharynx: Oropharynx is clear.   Eyes:      Conjunctiva/sclera: Conjunctivae normal.      Pupils: Pupils are equal, round, and reactive to light.   Cardiovascular:      Pulses:           Dorsalis pedis pulses are 2+ on the right side and 2+ on the left side.        Posterior tibial pulses are 2+ on the right side and 2+ on the left side.   Pulmonary:      Effort: Pulmonary effort is normal.   Musculoskeletal:         General: Normal range of motion.      Cervical back: Normal range of motion.      Right lower leg: No edema.      Left lower leg: No edema.   Skin:     General: Skin is warm and dry.      Capillary Refill: Capillary refill takes less than 2 seconds.   Neurological:      General: No focal deficit present.      Mental Status: She is alert and oriented to person, place, and time. Mental status is at baseline.      Sensory: Sensory deficit present.   Psychiatric:         Mood and Affect: Mood normal.         Behavior: Behavior normal.         Thought Content: Thought content normal.         Judgment: Judgment normal.           [REMOVED] Wound 05/15/24 Toe D1, great Anterior;Right (Removed)   Wound Image Images linked 06/05/24 1448   Wound Description Epithelialization 06/05/24 1448   Lindsey-wound Assessment Intact 06/05/24 1448   Wound Length (cm) 0 cm 06/05/24 1448   Wound Width (cm) 0 cm 06/05/24 1448   Wound Depth (cm) 0 cm 06/05/24 1448   Wound Surface Area (cm^2) 0 cm^2 06/05/24 1448   Wound Volume (cm^3) 0 cm^3 06/05/24 1448   Calculated Wound Volume (cm^3) 0 cm^3 06/05/24 1448   Non-staged Wound Description Not applicable 06/05/24 1448   Dressing  Status Intact 06/05/24 1448          Results from last 6 Months   Lab Units 05/15/24  2026   WOUND CULTURE  4+ Growth of Streptococcus anginosus*  Few Colonies of       XR foot 3+ views RIGHT    Result Date: 5/17/2024  Narrative: XR FOOT 3+ VW RIGHT INDICATION: Evaluate for osteomyelitis and free air. COMPARISON: None FINDINGS: No acute fracture or dislocation. Calcaneal spurs. Degenerative changes in the first metatarsophalangeal joint. No lytic or blastic osseous lesion. Unremarkable soft tissues. No soft tissue gas.     Impression: No radiographic evidence of osteomyelitis. No soft tissue gas. No acute osseous abnormality. Workstation performed: [id number]     XR chest 1 view portable    Result Date: 5/16/2024  Narrative: XR CHEST PORTABLE INDICATION: shoulder pain. COMPARISON: Shoulder radiograph from 8/15/2024 FINDINGS: Right axillary surgical clips. Loop recorder. Clear lungs. No pneumothorax or pleural effusion. Normal cardiomediastinal silhouette. Degenerative changes in the shoulders, right slightly more than left.. Normal upper abdomen.     Impression: No acute cardiopulmonary disease. Workstation performed: [id number]     XR foot 3+ vw right    Result Date: 5/16/2024  Narrative: XR FOOT 3+ VW RIGHT INDICATION: foot pain. COMPARISON: None FINDINGS: No acute fracture or dislocation. Degenerative changes in the first metatarsophalangeal joint. No lytic or blastic osseous lesion. Unremarkable soft tissues.     Impression: No acute osseous abnormality. Degenerative changes as described. Workstation performed: [id number]     XR shoulder 2+ views RIGHT    Result Date: 5/16/2024  Narrative: XR SHOULDER 2+ VW RIGHT INDICATION: shoulder injury. COMPARISON: None FINDINGS: No acute fracture or dislocation. Mild glenohumeral and acromioclavicular osteoarthritis. No lytic or blastic osseous lesion. Unremarkable soft tissues.     Impression: No acute osseous abnormality. Workstation performed: [id number]      CT lower extremity w contrast right    Result Date: 5/15/2024  Narrative: CT right lower extremity with IV contrast INDICATION: wound, lymphangetic streaking, pain. COMPARISON: Same day right foot radiographs TECHNIQUE: CT examination of the above was performed.  This examination, like all CT scans performed in the [facility], was performed utilizing techniques to minimize radiation dose exposure, including the use of iterative reconstruction  and automated exposure control software.  Multiplanar 2D reformatted images were created from the source data. IV Contrast: 100 mL of iohexol (OMNIPAQUE) Rad dose  725.98 mGy-cm FINDINGS: OSSEOUS STRUCTURES:  No fracture, dislocation or destructive osseous lesion. VISUALIZED MUSCULATURE/SOFT TISSUES: Shallow soft tissue ulceration in the first webspace with 1.0 x 0.5 cm rim-enhancing collection in the first webspace. No soft tissue gas. OTHER PERTINENT FINDINGS:  None.     Impression: Shallow soft tissue ulceration between the first and second digits with 1.0 cm rim-enhancing collection in the first webspace. Workstation performed: [id number]       Wound Instructions:  Orders Placed This Encounter   Procedures    Wound cleansing and dressings Anterior;Right Toe D1, great     Right toe wound    Wound is healed today.  Patient to keep skin clean and dry.  Discharged today     Standing Status:   Future     Standing Expiration Date:   6/12/2024    Wound Procedure Treatment Anterior;Right Toe D1, great     This order was created via procedure documentation    Ambulatory Referral to Podiatry     Standing Status:   Future     Standing Expiration Date:   6/5/2025     Referral Priority:   Routine     Referral Type:   Consult - AMB     Referral Reason:   Specialty Services Required     Referred to Provider:   Zack Lam DPM     Requested Specialty:   Podiatry     Number of Visits Requested:   1     Expiration Date:   6/5/2025         Mya Tenorio DPM, CLARITA,  "FACFAS    Portions of the record may have been created with voice recognition software. Occasional wrong word or \"sound a like\" substitutions may have occurred due to the inherent limitations of voice recognition software. Read the chart carefully and recognize, using context, where substitutions have occurred.      "

## 2024-06-05 NOTE — PATIENT INSTRUCTIONS
Orders Placed This Encounter   Procedures    Wound cleansing and dressings Anterior;Right Toe D1, great     Right toe wound    Wound is healed today.  Patient to keep skin clean and dry.  Discharged today     Standing Status:   Future     Standing Expiration Date:   6/12/2024

## 2024-06-25 ENCOUNTER — OFFICE VISIT (OUTPATIENT)
Dept: PODIATRY | Facility: CLINIC | Age: 79
End: 2024-06-25
Payer: MEDICARE

## 2024-06-25 VITALS
HEIGHT: 62 IN | SYSTOLIC BLOOD PRESSURE: 117 MMHG | DIASTOLIC BLOOD PRESSURE: 67 MMHG | WEIGHT: 178 LBS | BODY MASS INDEX: 32.76 KG/M2 | HEART RATE: 93 BPM

## 2024-06-25 DIAGNOSIS — B35.1 ONYCHOMYCOSIS: Primary | ICD-10-CM

## 2024-06-25 DIAGNOSIS — E11.42 DIABETIC POLYNEUROPATHY ASSOCIATED WITH TYPE 2 DIABETES MELLITUS (HCC): ICD-10-CM

## 2024-06-25 DIAGNOSIS — L84 CORNS AND CALLUS: ICD-10-CM

## 2024-06-25 PROCEDURE — 11055 PARING/CUTG B9 HYPRKER LES 1: CPT | Performed by: PODIATRIST

## 2024-06-25 PROCEDURE — 11721 DEBRIDE NAIL 6 OR MORE: CPT | Performed by: PODIATRIST

## 2024-06-25 RX ORDER — GLIMEPIRIDE 4 MG/1
TABLET ORAL
COMMUNITY

## 2024-06-25 NOTE — PROGRESS NOTES
PATIENT:  Jackie Cunningham  1945    ASSESSMENT:  1. Onychomycosis  Ambulatory Referral to Podiatry      2. Corns and callus        3. Diabetic polyneuropathy associated with type 2 diabetes mellitus (HCC)  Ambulatory Referral to Podiatry            No orders of the defined types were placed in this encounter.       PLAN:  Disease prevention and related risk factors of diabetes were identified and discussed.    The patient was educated in proper foot wear for diabetics.    Educated in daily foot assessment and routine diabetic foot care.    Discussed the importance of controlling BS through diet and exercise.    The patient will follow up in 9-12 weeks for further diabetic foot exam and care.      Procedures: 07538, 53782  All mycotic toenails were reduced and debrided in length, width, and girth using a nail nipper and electric dremel.    All hyperkeratotic skin lesion(s) if present were sharply pared with a #10 scalpel with no evidence of ulceration/abscess.    Patient tolerated procedure(s) well without complications.    Procedures     HPI:  Jackie Cunningham is a 78 y.o.year old female seen for diabetic foot exam.  Patient has class findings with type II DM.   She reports her BS is under control.  Patient concerned of thick toenails and painful lesion tip of right second toe.  Reports her wound has healed on her right foot and is no longer giving her any issues.  The patient denied any acute pedal disorder, redness, acute swelling, or recent injury.      The following portions of the patient's history were reviewed and updated as appropriate: allergies, current medications, past family history, past medical history, past social history, past surgical history, and problem list.    REVIEW OF SYSTEMS:  GENERAL: No fever or chills  HEART: No chest pain, or palpitation  RESPIRATORY:  No acute SOB or cough  GI: No Nausea, vomit or diarrhea  NEUROLOGIC: No syncope or acute weakness    PHYSICAL EXAM:    /67 (BP  "Location: Left arm, Patient Position: Sitting, Cuff Size: Adult)   Pulse 93   Ht 5' 2\" (1.575 m) Comment: STATED  Wt 80.7 kg (178 lb)   BMI 32.56 kg/m²     VASCULAR EXAM:  Posterior tibial artery absent bilateral  Dorsalis pedis artery +1 bilateral  The patient has moderate skin atrophy, color changes, lack of digital hair, and nail dystrophy.    There is trace lower extremity edema bilaterally.      NEUROLOGIC EXAM:  Sensation is intact to light touch. Yes   Sensation is diminished to 10gm monofilament.    Vibratory sensation diminished.     Numb tingling paresthesias noted bilateral bilateral in toes.   Proprioception intact        DERMATOLOGIC EXAM:   Texture, Tone and Turgor are diminished bilateral.    The patient has dystrophic/hypertrophic toenails with yellow/white discoloration, onycholysis, and subungal debris.   Fungal odor noted.  Brittle nature noted.  Right foot nails dystrophic x 5 with 0.35 cm ave thickness (1 through 5)  Left foot nails dystrophic x 5 with 0.4 cm ave thickness (1 through 5)    Patient has hyperkeratotic lesions noted:  Right foot located at tip second toe.  Left foot located at none  No notable suspicious skin lesions.      MUSCULOSKELETAL EXAM:   No acute joint pain, edema, or redness.  Denies any acute musculoskeletal problem.    Patient has no gross pedal deformities. Patient has no ambulation limitations.      Patient wears DM shoes? No    Risk Category/Class Findings:  Q8(B2 ABC, B1)  0 = No loss of protective sensation    A1)  Has the patient had a previous amputation of the foot or integral skeletal portion thereof? No  B1)  Does the patient have absent posterior tibial pulse? Yes  B3)  Does the patient have absent dorsalis pedis? No  B2)  Does the patient have three of the following? Yes           1.  Hair growth (increased or decreased), 2.  Nail changes (thickening), and 3.  Pigmentary changes (discoloring)  C)  Does the patient have two of the following and one above? " No

## 2024-09-26 ENCOUNTER — OFFICE VISIT (OUTPATIENT)
Dept: PODIATRY | Facility: CLINIC | Age: 79
End: 2024-09-26
Payer: MEDICARE

## 2024-09-26 VITALS
WEIGHT: 184 LBS | DIASTOLIC BLOOD PRESSURE: 71 MMHG | BODY MASS INDEX: 33.86 KG/M2 | HEART RATE: 81 BPM | HEIGHT: 62 IN | SYSTOLIC BLOOD PRESSURE: 111 MMHG

## 2024-09-26 DIAGNOSIS — B35.1 ONYCHOMYCOSIS: Primary | ICD-10-CM

## 2024-09-26 DIAGNOSIS — E11.42 DIABETIC POLYNEUROPATHY ASSOCIATED WITH TYPE 2 DIABETES MELLITUS (HCC): ICD-10-CM

## 2024-09-26 PROCEDURE — 11721 DEBRIDE NAIL 6 OR MORE: CPT | Performed by: PODIATRIST

## 2024-09-29 NOTE — PROGRESS NOTES
"   PATIENT:  Jackie Cunningham  1945    ASSESSMENT:  1. Onychomycosis        2. Diabetic polyneuropathy associated with type 2 diabetes mellitus (HCC)              No orders of the defined types were placed in this encounter.       PLAN:  Disease prevention and related risk factors of diabetes were identified and discussed.    The patient was educated in proper foot wear for diabetics.    Educated in daily foot assessment and routine diabetic foot care.    Discussed the importance of controlling BS through diet and exercise.    The patient will follow up in 9-12 weeks for further diabetic foot exam and care.      Procedures: 87183  All mycotic toenails were reduced and debrided in length, width, and girth using a nail nipper and electric dremel.    All hyperkeratotic skin lesion(s) if present were sharply pared with a #10 scalpel with no evidence of ulceration/abscess.    Patient tolerated procedure(s) well without complications.    Procedures     HPI:  Jackie Cunningham is a 79 y.o.year old female seen for diabetic foot exam.  Patient has class findings with type II DM.   She reports her BS is under control.  Patient concerned of thick toenails and painful lesion tip of right second toe.  Reports her wound has healed on her right foot and is no longer giving her any issues.  The patient denied any acute pedal disorder, redness, acute swelling, or recent injury.      The following portions of the patient's history were reviewed and updated as appropriate: allergies, current medications, past family history, past medical history, past social history, past surgical history, and problem list.    REVIEW OF SYSTEMS:  GENERAL: No fever or chills  HEART: No chest pain, or palpitation  RESPIRATORY:  No acute SOB or cough  GI: No Nausea, vomit or diarrhea  NEUROLOGIC: No syncope or acute weakness    PHYSICAL EXAM:    /71 (BP Location: Left arm, Patient Position: Sitting, Cuff Size: Adult)   Pulse 81   Ht 5' 2\" (1.575 m) " Comment: stated  Wt 83.5 kg (184 lb)   BMI 33.65 kg/m²     VASCULAR EXAM:  Posterior tibial artery absent bilateral  Dorsalis pedis artery +1 bilateral  The patient has moderate skin atrophy, color changes, lack of digital hair, and nail dystrophy.    There is trace lower extremity edema bilaterally.      NEUROLOGIC EXAM:  Sensation is intact to light touch. Yes   Sensation is diminished to 10gm monofilament.    Vibratory sensation diminished.     Numb tingling paresthesias noted bilateral bilateral in toes.   Proprioception intact        DERMATOLOGIC EXAM:   Texture, Tone and Turgor are diminished bilateral.    The patient has dystrophic/hypertrophic toenails with yellow/white discoloration, onycholysis, and subungal debris.   Fungal odor noted.  Brittle nature noted.  Right foot nails dystrophic x 5 with 0.35 cm ave thickness (1 through 5)  Left foot nails dystrophic x 5 with 0.4 cm ave thickness (1 through 5)    Patient has hyperkeratotic lesions noted:  Right foot located at tip second toe diminished.  Left foot located at none  No notable suspicious skin lesions.      MUSCULOSKELETAL EXAM:   No acute joint pain, edema, or redness.  Denies any acute musculoskeletal problem.    Patient has no gross pedal deformities. Patient has no ambulation limitations.      Patient wears DM shoes? No    Risk Category/Class Findings:  Q8(B2 ABC, B1)  0 = No loss of protective sensation    A1)  Has the patient had a previous amputation of the foot or integral skeletal portion thereof? No  B1)  Does the patient have absent posterior tibial pulse? Yes  B3)  Does the patient have absent dorsalis pedis? No  B2)  Does the patient have three of the following? Yes           1.  Hair growth (increased or decreased), 2.  Nail changes (thickening), and 3.  Pigmentary changes (discoloring)  C)  Does the patient have two of the following and one above? No

## 2025-01-14 ENCOUNTER — OFFICE VISIT (OUTPATIENT)
Dept: PODIATRY | Facility: CLINIC | Age: 80
End: 2025-01-14
Payer: MEDICARE

## 2025-01-14 VITALS — WEIGHT: 179 LBS | BODY MASS INDEX: 32.94 KG/M2 | HEIGHT: 62 IN

## 2025-01-14 DIAGNOSIS — B35.1 ONYCHOMYCOSIS: Primary | ICD-10-CM

## 2025-01-14 DIAGNOSIS — L84 CORNS AND CALLUS: ICD-10-CM

## 2025-01-14 DIAGNOSIS — E11.42 DIABETIC POLYNEUROPATHY ASSOCIATED WITH TYPE 2 DIABETES MELLITUS (HCC): ICD-10-CM

## 2025-01-14 PROCEDURE — 11055 PARING/CUTG B9 HYPRKER LES 1: CPT | Performed by: PODIATRIST

## 2025-01-14 PROCEDURE — 11721 DEBRIDE NAIL 6 OR MORE: CPT | Performed by: PODIATRIST

## 2025-01-14 PROCEDURE — RECHECK: Performed by: PODIATRIST

## 2025-01-14 RX ORDER — PROCHLORPERAZINE 25 MG/1
SUPPOSITORY RECTAL
COMMUNITY

## 2025-01-14 NOTE — PROGRESS NOTES
"   PATIENT:  Jackie Cunningham  1945    ASSESSMENT:  1. Onychomycosis        2. Corns and callus        3. Diabetic polyneuropathy associated with type 2 diabetes mellitus (HCC)                No orders of the defined types were placed in this encounter.       PLAN:  Disease prevention and related risk factors of diabetes were identified and discussed.    The patient was educated in proper foot wear for diabetics.    Educated in daily foot assessment and routine diabetic foot care.    Discussed the importance of controlling BS through diet and exercise.    The patient will follow up in 9-12 weeks for further diabetic foot exam and care.      Procedures: 32482, 74274  All mycotic toenails were reduced and debrided in length, width, and girth using a nail nipper and electric dremel.    All hyperkeratotic skin lesion(s) if present were sharply pared with a #10 scalpel with no evidence of ulceration/abscess.    Patient tolerated procedure(s) well without complications.    Procedures     HPI:  Jackie Cunningham is a 79 y.o.year old female seen for diabetic foot exam.  Patient has class findings with type II DM.   She reports her BS is under control.  Patient concerned of thick toenails and painful lesion tip of right second toe.  Reports her wound has healed on her right foot and is no longer giving her any issues.  The patient denied any acute pedal disorder, redness, acute swelling, or recent injury.      The following portions of the patient's history were reviewed and updated as appropriate: allergies, current medications, past family history, past medical history, past social history, past surgical history, and problem list.    REVIEW OF SYSTEMS:  GENERAL: No fever or chills  HEART: No chest pain, or palpitation  RESPIRATORY:  No acute SOB or cough  GI: No Nausea, vomit or diarrhea  NEUROLOGIC: No syncope or acute weakness    PHYSICAL EXAM:    Ht 5' 2\" (1.575 m) Comment: stated  Wt 81.2 kg (179 lb)   BMI 32.74 kg/m² "     VASCULAR EXAM:  Posterior tibial artery absent bilateral  Dorsalis pedis artery +1 bilateral  The patient has moderate skin atrophy, color changes, lack of digital hair, and nail dystrophy.    There is trace lower extremity edema bilaterally.      NEUROLOGIC EXAM:  Sensation is intact to light touch. Yes   Sensation is diminished to 10gm monofilament.    Vibratory sensation diminished.     Numb tingling paresthesias noted bilateral bilateral in toes.   Proprioception intact        DERMATOLOGIC EXAM:   Texture, Tone and Turgor are diminished bilateral.    The patient has dystrophic/hypertrophic toenails with yellow/white discoloration, onycholysis, and subungal debris.   Fungal odor noted.  Brittle nature noted.  Right foot nails dystrophic x 5 with 0.35 cm ave thickness (1 through 5)  Left foot nails dystrophic x 5 with 0.4 cm ave thickness (1 through 5)    Patient has hyperkeratotic lesions noted:  Right foot located at tip second toe diminished.  Left foot located at none  No notable suspicious skin lesions.      MUSCULOSKELETAL EXAM:   No acute joint pain, edema, or redness.  Denies any acute musculoskeletal problem.    Patient has no gross pedal deformities. Patient has no ambulation limitations.      Patient wears DM shoes? No    Risk Category/Class Findings:  Q8(B2 ABC, B1)  0 = No loss of protective sensation    A1)  Has the patient had a previous amputation of the foot or integral skeletal portion thereof? No  B1)  Does the patient have absent posterior tibial pulse? Yes  B3)  Does the patient have absent dorsalis pedis? No  B2)  Does the patient have three of the following? Yes           1.  Hair growth (increased or decreased), 2.  Nail changes (thickening), and 3.  Pigmentary changes (discoloring)  C)  Does the patient have two of the following and one above? No

## 2025-04-10 ENCOUNTER — TELEPHONE (OUTPATIENT)
Dept: OTHER | Facility: OTHER | Age: 80
End: 2025-04-10

## 2025-04-10 NOTE — TELEPHONE ENCOUNTER
Patient is calling regarding cancelling an appointment.     Date/Time: TODAY, 4/10 @ 10:40AM     Patient was rescheduled: YES [ ] NO [x ]     Patient requesting call back to reschedule: YES [x ] NO [ ]  *Patient not feeling well. [facility] can no longer reschedule specialty appointments. Please call patient back to assist with rescheduling.   Thank you!

## 2025-05-09 ENCOUNTER — PROCEDURE VISIT (OUTPATIENT)
Dept: PODIATRY | Facility: CLINIC | Age: 80
End: 2025-05-09
Payer: MEDICARE

## 2025-05-09 VITALS — HEIGHT: 62 IN | WEIGHT: 171 LBS | BODY MASS INDEX: 31.47 KG/M2

## 2025-05-09 DIAGNOSIS — L84 CORNS AND CALLUS: ICD-10-CM

## 2025-05-09 DIAGNOSIS — B35.1 ONYCHOMYCOSIS: Primary | ICD-10-CM

## 2025-05-09 DIAGNOSIS — E11.42 DIABETIC POLYNEUROPATHY ASSOCIATED WITH TYPE 2 DIABETES MELLITUS (HCC): ICD-10-CM

## 2025-05-09 PROCEDURE — 11721 DEBRIDE NAIL 6 OR MORE: CPT | Performed by: PODIATRIST

## 2025-05-09 PROCEDURE — 11055 PARING/CUTG B9 HYPRKER LES 1: CPT | Performed by: PODIATRIST

## 2025-05-09 RX ORDER — TORSEMIDE 10 MG/1
TABLET ORAL EVERY 24 HOURS
COMMUNITY
Start: 2025-01-30

## 2025-05-09 RX ORDER — FLUCONAZOLE 150 MG/1
TABLET ORAL
COMMUNITY
Start: 2025-04-22

## 2025-05-09 RX ORDER — DESVENLAFAXINE 50 MG/1
TABLET, FILM COATED, EXTENDED RELEASE ORAL EVERY 24 HOURS
COMMUNITY
Start: 2025-02-11

## 2025-05-09 NOTE — PROGRESS NOTES
"   PATIENT:  Jackie Cunningham  1945    ASSESSMENT:  1. Onychomycosis        2. Corns and callus        3. Diabetic polyneuropathy associated with type 2 diabetes mellitus (HCC)                  No orders of the defined types were placed in this encounter.       PLAN:  Disease prevention and related risk factors of diabetes were identified and discussed.    The patient was educated in proper foot wear for diabetics.    Educated in daily foot assessment and routine diabetic foot care.    Discussed the importance of controlling BS through diet and exercise.    The patient will follow up in 9-12 weeks for further diabetic foot exam and care.      Procedures: 94392, 36403  All mycotic toenails were reduced and debrided in length, width, and girth using a nail nipper and electric dremel.    All hyperkeratotic skin lesion(s) if present were sharply pared with a #10 scalpel with no evidence of ulceration/abscess.    Patient tolerated procedure(s) well without complications.    Procedures     HPI:  Jackie Cunningham is a 79 y.o.year old female seen for diabetic foot exam.  Patient has class findings with type II DM.   She reports her BS is under control.  Patient concerned of thick toenails and painful lesion tip of right second toe.  Reports her wound has healed on her right foot and is no longer giving her any issues.  The patient denied any acute pedal disorder, redness, acute swelling, or recent injury.      The following portions of the patient's history were reviewed and updated as appropriate: allergies, current medications, past family history, past medical history, past social history, past surgical history, and problem list.    REVIEW OF SYSTEMS:  GENERAL: No fever or chills  HEART: No chest pain, or palpitation  RESPIRATORY:  No acute SOB or cough  GI: No Nausea, vomit or diarrhea  NEUROLOGIC: No syncope or acute weakness    PHYSICAL EXAM:    Ht 5' 2\" (1.575 m) Comment: stated  Wt 77.6 kg (171 lb)   BMI 31.28 kg/m² "     VASCULAR EXAM:  Posterior tibial artery absent bilateral  Dorsalis pedis artery +1 bilateral  The patient has moderate skin atrophy, color changes, lack of digital hair, and nail dystrophy.    There is trace lower extremity edema bilaterally.      NEUROLOGIC EXAM:  Sensation is intact to light touch. Yes   Sensation is diminished to 10gm monofilament.    Vibratory sensation diminished.     Numb tingling paresthesias noted bilateral bilateral in toes.   Proprioception intact        DERMATOLOGIC EXAM:   Texture, Tone and Turgor are diminished bilateral.    The patient has dystrophic/hypertrophic toenails with yellow/white discoloration, onycholysis, and subungal debris.   Fungal odor noted.  Brittle nature noted.  Right foot nails dystrophic x 5 with 0.35 cm ave thickness (1 through 5)  Left foot nails dystrophic x 5 with 0.4 cm ave thickness (1 through 5)    Patient has hyperkeratotic lesions noted:  Right foot located at tip second toe diminished.  Left foot located at none  No notable suspicious skin lesions.      MUSCULOSKELETAL EXAM:   No acute joint pain, edema, or redness.  Denies any acute musculoskeletal problem.    Patient has no gross pedal deformities. Patient has no ambulation limitations.      Patient wears DM shoes? No    Risk Category/Class Findings:  Q8(B2 ABC, B1)  0 = No loss of protective sensation    A1)  Has the patient had a previous amputation of the foot or integral skeletal portion thereof? No  B1)  Does the patient have absent posterior tibial pulse? Yes  B3)  Does the patient have absent dorsalis pedis? No  B2)  Does the patient have three of the following? Yes           1.  Hair growth (increased or decreased), 2.  Nail changes (thickening), and 3.  Pigmentary changes (discoloring)  C)  Does the patient have two of the following and one above? No

## 2025-05-27 ENCOUNTER — OFFICE VISIT (OUTPATIENT)
Dept: ENDOCRINOLOGY | Facility: HOSPITAL | Age: 80
End: 2025-05-27
Payer: MEDICARE

## 2025-05-27 ENCOUNTER — TELEPHONE (OUTPATIENT)
Age: 80
End: 2025-05-27

## 2025-05-27 VITALS
OXYGEN SATURATION: 98 % | SYSTOLIC BLOOD PRESSURE: 122 MMHG | DIASTOLIC BLOOD PRESSURE: 68 MMHG | BODY MASS INDEX: 35.92 KG/M2 | HEART RATE: 86 BPM | WEIGHT: 195.2 LBS | HEIGHT: 62 IN

## 2025-05-27 DIAGNOSIS — L97.509 TYPE 2 DIABETES MELLITUS WITH FOOT ULCER, WITH LONG-TERM CURRENT USE OF INSULIN (HCC): ICD-10-CM

## 2025-05-27 DIAGNOSIS — Z79.4 TYPE 2 DIABETES MELLITUS WITH FOOT ULCER, WITH LONG-TERM CURRENT USE OF INSULIN (HCC): ICD-10-CM

## 2025-05-27 DIAGNOSIS — E11.621 TYPE 2 DIABETES MELLITUS WITH FOOT ULCER, WITH LONG-TERM CURRENT USE OF INSULIN (HCC): ICD-10-CM

## 2025-05-27 DIAGNOSIS — E78.2 MIXED HYPERLIPIDEMIA: ICD-10-CM

## 2025-05-27 DIAGNOSIS — I10 PRIMARY HYPERTENSION: ICD-10-CM

## 2025-05-27 DIAGNOSIS — N18.31 CHRONIC KIDNEY DISEASE, STAGE 3A (HCC): ICD-10-CM

## 2025-05-27 DIAGNOSIS — E11.65 POORLY CONTROLLED TYPE 2 DIABETES MELLITUS (HCC): Primary | ICD-10-CM

## 2025-05-27 DIAGNOSIS — E66.812 CLASS 2 OBESITY: ICD-10-CM

## 2025-05-27 PROCEDURE — 99204 OFFICE O/P NEW MOD 45 MIN: CPT | Performed by: STUDENT IN AN ORGANIZED HEALTH CARE EDUCATION/TRAINING PROGRAM

## 2025-05-27 PROCEDURE — G2211 COMPLEX E/M VISIT ADD ON: HCPCS | Performed by: STUDENT IN AN ORGANIZED HEALTH CARE EDUCATION/TRAINING PROGRAM

## 2025-05-27 PROCEDURE — 95251 CONT GLUC MNTR ANALYSIS I&R: CPT | Performed by: STUDENT IN AN ORGANIZED HEALTH CARE EDUCATION/TRAINING PROGRAM

## 2025-05-27 RX ORDER — PROCHLORPERAZINE 25 MG/1
SUPPOSITORY RECTAL
COMMUNITY
Start: 2025-02-06

## 2025-05-27 RX ORDER — SERTRALINE HYDROCHLORIDE 100 MG/1
1 TABLET, FILM COATED ORAL DAILY
COMMUNITY
Start: 2025-04-22

## 2025-05-27 RX ORDER — INSULIN ASPART 100 [IU]/ML
10-15 INJECTION, SOLUTION INTRAVENOUS; SUBCUTANEOUS
Qty: 13.5 ML | Refills: 0 | Status: SHIPPED | OUTPATIENT
Start: 2025-05-27 | End: 2025-05-27 | Stop reason: SDUPTHER

## 2025-05-27 RX ORDER — DAPAGLIFLOZIN 10 MG/1
10 TABLET, FILM COATED ORAL DAILY
Qty: 90 TABLET | Refills: 1 | Status: SHIPPED | OUTPATIENT
Start: 2025-05-27 | End: 2025-05-27

## 2025-05-27 RX ORDER — INSULIN GLARGINE 100 [IU]/ML
35 INJECTION, SOLUTION SUBCUTANEOUS EVERY MORNING
Qty: 45 ML | Refills: 3 | Status: SHIPPED | OUTPATIENT
Start: 2025-05-27 | End: 2025-06-26

## 2025-05-27 RX ORDER — DAPAGLIFLOZIN 5 MG/1
TABLET, FILM COATED ORAL EVERY 24 HOURS
COMMUNITY
End: 2025-05-27

## 2025-05-27 RX ORDER — INSULIN GLARGINE 100 [IU]/ML
35 INJECTION, SOLUTION SUBCUTANEOUS EVERY MORNING
Qty: 10.5 ML | Refills: 0 | Status: SHIPPED | OUTPATIENT
Start: 2025-05-27 | End: 2025-05-27 | Stop reason: SDUPTHER

## 2025-05-27 RX ORDER — INSULIN ASPART 100 [IU]/ML
10-15 INJECTION, SOLUTION INTRAVENOUS; SUBCUTANEOUS
Qty: 45 ML | Refills: 1 | Status: SHIPPED | OUTPATIENT
Start: 2025-05-27 | End: 2025-06-26

## 2025-05-27 RX ORDER — PEN NEEDLE, DIABETIC 32GX 5/32"
NEEDLE, DISPOSABLE MISCELLANEOUS
COMMUNITY

## 2025-05-27 RX ORDER — MAGNESIUM 200 MG
TABLET ORAL EVERY 24 HOURS
COMMUNITY

## 2025-05-27 RX ORDER — DAPAGLIFLOZIN 5 MG/1
5 TABLET, FILM COATED ORAL DAILY
Qty: 100 TABLET | Refills: 1 | Status: SHIPPED | OUTPATIENT
Start: 2025-05-27

## 2025-05-27 NOTE — ASSESSMENT & PLAN NOTE
Orders:    Lipid panel; Future    Hemoglobin A1C; Future    Comprehensive metabolic panel; Future    Albumin / creatinine urine ratio; Future    Ambulatory referral to Diabetic Education; Future

## 2025-05-27 NOTE — ASSESSMENT & PLAN NOTE
Lab Results   Component Value Date    EGFR 63 05/20/2024    EGFR 71 05/19/2024    EGFR 64 05/18/2024    CREATININE 0.89 05/20/2024    CREATININE 0.80 05/19/2024    CREATININE 0.88 05/18/2024

## 2025-05-27 NOTE — TELEPHONE ENCOUNTER
"[facility] pharmacist calling for clarification as Lantus and Humalog scripts have 2 sets of instructions. Confirmed per the appointment today \"  Lantus 35u   Lispro 10u with bk, lunch, 15u dinner\"      No further action needed  "

## 2025-05-27 NOTE — PROGRESS NOTES
Name: Jackie Cunningham      : 1945      MRN: [id number]  Encounter Provider: Maria Dolores Berry MD  Encounter Date: 2025   Encounter department: [facility]    No chief complaint on file.  :  Assessment & Plan  Poorly controlled type 2 diabetes mellitus (HCC)    Lab Results   Component Value Date    HGBA1C 13.5 (H) 2024       Orders:    Lipid panel; Future    Hemoglobin A1C; Future    Comprehensive metabolic panel; Future    Albumin / creatinine urine ratio; Future    Ambulatory referral to Diabetic Education; Future    Class 2 obesity    Orders:    Lipid panel; Future    Hemoglobin A1C; Future    Comprehensive metabolic panel; Future    Albumin / creatinine urine ratio; Future    Ambulatory referral to Diabetic Education; Future    Mixed hyperlipidemia    Orders:    Lipid panel; Future    Hemoglobin A1C; Future    Comprehensive metabolic panel; Future    Albumin / creatinine urine ratio; Future    Ambulatory referral to Diabetic Education; Future    Primary hypertension    Orders:    Lipid panel; Future    Hemoglobin A1C; Future    Comprehensive metabolic panel; Future    Albumin / creatinine urine ratio; Future    Ambulatory referral to Diabetic Education; Future    Chronic kidney disease, stage 3a (HCC)  Lab Results   Component Value Date    EGFR 63 2024    EGFR 71 2024    EGFR 64 2024    CREATININE 0.89 2024    CREATININE 0.80 2024    CREATININE 0.88 2024            Plan:    Patient is a  79yF With PMHx of poorly controlled T2DM since. Age 37 With complications of CVA, retinopathy and neuropathy Who presents today for diabetic care.     1) T2DM:- Patient has poor diabetic control possibly since  reportedly after the passing of her . We do not have old records. But A1C  was 13.8% and  was 13%. Patient reports was on insulin SS with lantus before and since then is on standing dose. Per patient her A1C  this month was 8% however her CGM shows TIR only 25% with high BG overall and also PP with dinner. Her meals are also high in carbs. Patient does have several medication intolerance and also really high BG and hence will simply use MDI for now and adjust both. Given UTI on farxiga, will not inc dose. C/w CGM/ Refer to CDE    Plan   - Inc lantus to 35u   - c/w novolog 10u with bk and lunch and inc to 15u with dinner.   - c/w farxiga 5mg   - c/w CGM  - See CDE    Screening:-   Retinopathy- UTD, positive  Nephropathy- Will order  Lipide levels- UTD    2) Hypertension:- BP in clinic 122/68, c/w current regime    3) Hyperlipidemia:- LDL <100, c/w statin    RTC in 3 months       Counseled patient on diagnostic results, prognosis, risk and benefit of treatment options, instruction for management, importance of treatment compliance, Risk  factor reduction and impressions    Pertinent Medical History         History of Present Illness     Jackie Cunningham is a 79 y.o. female    History of Present Illness:   Patient is a 79yF with a history of poorly controlled type 2 diabetes With complications of CVA 4 months ago, retinopathy on eylea injections on both eyes, neuropathy, afib s/p ablation presents today for diabetes management.     Patient was first diagnosed with T2DM- since age 37.   Control since diagnosis: Poor  Medications tried thus far: Glimepride- stopped d/t being light headed, ozempic- caused abdominal pain. Has UTI  Current regime:- farxiga 5mg, novolog 10u with meals, lantus 23u daily   BG control:- Jackie Cunningham   Device used Dexcom    Indication     Type 2 Diabetes    More than 72 hours of data was reviewed. Report to be scanned to chart.     Date Range: May 14- May 27 2025    Analysis of data:   Average Glucose: 248  Time in Target Range:  25%  Time Above Range:  29%/46%  Time Below Range:  0%    Interpretation of data: High BG throughout the day, mostly with dinner    Hypoglycemic episodes: none   Hyperglycemia  "symptoms: no polyuria or polydipsia\",\"no chest pain, dyspnea or TIAs\",\"no numbness, tingling or pain in extremities\"  Diet: bk cinnamon waffles, lunch chicken salad or sandwith- bbq sauce, dinner spagettios or sandwitch or frozen meals- shrimp scampy.   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      Review of Systems   Constitutional:  Negative for diaphoresis, fatigue and unexpected weight change.   Eyes:  Negative for photophobia and visual disturbance.   Gastrointestinal:  Negative for constipation, diarrhea and vomiting.   Endocrine: Negative for polydipsia and polyuria.   Skin: Negative.     as per HPI     Medical History Reviewed by provider this encounter:     .    Objective   There were no vitals taken for this visit.     There is no height or weight on file to calculate BMI.  Wt Readings from Last 3 Encounters:   05/09/25 77.6 kg (171 lb)   01/14/25 81.2 kg (179 lb)   09/26/24 83.5 kg (184 lb)     Physical Exam  Constitutional:       Appearance: Normal appearance. She is obese.     Cardiovascular:      Rate and Rhythm: Normal rate.      Pulses: no weak pulses.           Dorsalis pedis pulses are 2+ on the right side and 2+ on the left side.   Pulmonary:      Effort: Pulmonary effort is normal.   Abdominal:      General: Bowel sounds are normal.      Palpations: Abdomen is soft.   Feet:      Right foot:      Skin integrity: No ulcer, skin breakdown, erythema, warmth, callus or dry skin.      Left foot:      Skin integrity: No ulcer, skin breakdown, erythema, warmth, callus or dry skin.     Skin:     General: Skin is warm.      Capillary Refill: Capillary refill takes less " than 2 seconds.     Neurological:      General: No focal deficit present.      Mental Status: She is alert.     Psychiatric:         Mood and Affect: Mood normal.       Patient's shoes and socks removed.    Right Foot/Ankle   Right Foot Inspection  Skin Exam: skin normal and skin intact. No dry skin, no warmth, no callus, no erythema, no maceration, no abnormal color, no pre-ulcer, no ulcer and no callus.     Toe Exam: ROM and strength within normal limits.     Sensory   Vibration: intact  Monofilament testing: intact    Vascular  Capillary refills: < 3 seconds  The right DP pulse is 2+.     Left Foot/Ankle  Left Foot Inspection  Skin Exam: skin normal and skin intact. No dry skin, no warmth, no erythema, no maceration, normal color, no pre-ulcer, no ulcer and no callus.     Toe Exam: ROM and strength within normal limits.     Sensory   Vibration: intact  Monofilament testing: intact    Vascular  Capillary refills: < 3 seconds  The left DP pulse is 2+.     Assign Risk Category  No deformity present  No loss of protective sensation  No weak pulses  Risk: 0     Labs: I have reviewed pertinent labs including:    Latest Reference Range & Units 05/16/24 18:36   Hemoglobin A1C Normal 4.0-5.6%; PreDiabetic 5.7-6.4%; Diabetic >=6.5%; Glycemic control for adults with diabetes <7.0% % 13.5 (H)   (H): Data is abnormally high        Discussed with the patient and all questioned fully answered. She will call me if any problems arise.

## 2025-08-21 ENCOUNTER — TELEPHONE (OUTPATIENT)
Dept: OTHER | Facility: OTHER | Age: 80
End: 2025-08-21